# Patient Record
Sex: MALE | Race: WHITE | NOT HISPANIC OR LATINO | Employment: FULL TIME | URBAN - METROPOLITAN AREA
[De-identification: names, ages, dates, MRNs, and addresses within clinical notes are randomized per-mention and may not be internally consistent; named-entity substitution may affect disease eponyms.]

---

## 2018-08-29 ENCOUNTER — APPOINTMENT (OUTPATIENT)
Dept: RADIOLOGY | Facility: MEDICAL CENTER | Age: 33
DRG: 224 | End: 2018-08-29
Attending: INTERNAL MEDICINE
Payer: COMMERCIAL

## 2018-08-29 ENCOUNTER — APPOINTMENT (OUTPATIENT)
Dept: RADIOLOGY | Facility: MEDICAL CENTER | Age: 33
DRG: 224 | End: 2018-08-29
Attending: EMERGENCY MEDICINE
Payer: COMMERCIAL

## 2018-08-29 ENCOUNTER — HOSPITAL ENCOUNTER (INPATIENT)
Facility: MEDICAL CENTER | Age: 33
LOS: 4 days | DRG: 224 | End: 2018-09-02
Attending: EMERGENCY MEDICINE | Admitting: INTERNAL MEDICINE
Payer: COMMERCIAL

## 2018-08-29 DIAGNOSIS — I46.9 CARDIAC ARREST (HCC): ICD-10-CM

## 2018-08-29 PROBLEM — Z99.11 VENTILATOR DEPENDENT (HCC): Status: ACTIVE | Noted: 2018-08-29

## 2018-08-29 PROBLEM — I35.0 AORTIC STENOSIS: Status: ACTIVE | Noted: 2018-08-29

## 2018-08-29 PROBLEM — N17.9 AKI (ACUTE KIDNEY INJURY) (HCC): Status: ACTIVE | Noted: 2018-08-29

## 2018-08-29 PROBLEM — R79.89 ELEVATED TROPONIN: Status: ACTIVE | Noted: 2018-08-29

## 2018-08-29 PROBLEM — D72.829 LEUKOCYTOSIS: Status: ACTIVE | Noted: 2018-08-29

## 2018-08-29 PROBLEM — R73.9 HYPERGLYCEMIA: Status: ACTIVE | Noted: 2018-08-29

## 2018-08-29 PROBLEM — E87.6 HYPOKALEMIA: Status: ACTIVE | Noted: 2018-08-29

## 2018-08-29 LAB
ABO GROUP BLD: NORMAL
ABO GROUP BLD: NORMAL
ACTION RANGE TRIGGERED IACRT: NO
ACTION RANGE TRIGGERED IACRT: NO
ALBUMIN SERPL BCP-MCNC: 4.4 G/DL (ref 3.2–4.9)
ALBUMIN/GLOB SERPL: 1.9 G/DL
ALP SERPL-CCNC: 68 U/L (ref 30–99)
ALT SERPL-CCNC: 33 U/L (ref 2–50)
AMPHET UR QL SCN: NEGATIVE
AMPHET UR QL SCN: NEGATIVE
ANION GAP SERPL CALC-SCNC: 12 MMOL/L (ref 0–11.9)
APPEARANCE UR: ABNORMAL
APTT PPP: 30.7 SEC (ref 24.7–36)
AST SERPL-CCNC: 40 U/L (ref 12–45)
BACTERIA #/AREA URNS HPF: ABNORMAL /HPF
BARBITURATES UR QL SCN: NEGATIVE
BARBITURATES UR QL SCN: NEGATIVE
BASE EXCESS BLDA CALC-SCNC: -1 MMOL/L (ref -4–3)
BASE EXCESS BLDA CALC-SCNC: 0 MMOL/L (ref -4–3)
BASOPHILS # BLD AUTO: 0.5 % (ref 0–1.8)
BASOPHILS # BLD: 0.07 K/UL (ref 0–0.12)
BENZODIAZ UR QL SCN: POSITIVE
BENZODIAZ UR QL SCN: POSITIVE
BILIRUB SERPL-MCNC: 1 MG/DL (ref 0.1–1.5)
BILIRUB UR QL STRIP.AUTO: NEGATIVE
BLD GP AB SCN SERPL QL: NORMAL
BNP SERPL-MCNC: 30 PG/ML (ref 0–100)
BODY TEMPERATURE: ABNORMAL DEGREES
BODY TEMPERATURE: ABNORMAL DEGREES
BUN SERPL-MCNC: 27 MG/DL (ref 8–22)
BZE UR QL SCN: NEGATIVE
BZE UR QL SCN: NEGATIVE
CALCIUM SERPL-MCNC: 8.4 MG/DL (ref 8.5–10.5)
CANNABINOIDS UR QL SCN: POSITIVE
CANNABINOIDS UR QL SCN: POSITIVE
CHLORIDE SERPL-SCNC: 106 MMOL/L (ref 96–112)
CO2 BLDA-SCNC: 25 MMOL/L (ref 20–33)
CO2 BLDA-SCNC: 28 MMOL/L (ref 20–33)
CO2 SERPL-SCNC: 23 MMOL/L (ref 20–33)
COLOR UR: YELLOW
CREAT SERPL-MCNC: 0.97 MG/DL (ref 0.5–1.4)
EKG IMPRESSION: NORMAL
EOSINOPHIL # BLD AUTO: 0.01 K/UL (ref 0–0.51)
EOSINOPHIL NFR BLD: 0.1 % (ref 0–6.9)
EPI CELLS #/AREA URNS HPF: ABNORMAL /HPF
ERYTHROCYTE [DISTWIDTH] IN BLOOD BY AUTOMATED COUNT: 41 FL (ref 35.9–50)
GLOBULIN SER CALC-MCNC: 2.3 G/DL (ref 1.9–3.5)
GLUCOSE SERPL-MCNC: 119 MG/DL (ref 65–99)
GLUCOSE UR STRIP.AUTO-MCNC: NEGATIVE MG/DL
HCO3 BLDA-SCNC: 24.1 MMOL/L (ref 17–25)
HCO3 BLDA-SCNC: 26.2 MMOL/L (ref 17–25)
HCT VFR BLD AUTO: 50.8 % (ref 42–52)
HGB BLD-MCNC: 17.5 G/DL (ref 14–18)
HYALINE CASTS #/AREA URNS LPF: ABNORMAL /LPF
IMM GRANULOCYTES # BLD AUTO: 0.07 K/UL (ref 0–0.11)
IMM GRANULOCYTES NFR BLD AUTO: 0.5 % (ref 0–0.9)
INR PPP: 3.04 (ref 0.87–1.13)
INST. QUALIFIED PATIENT IIQPT: YES
INST. QUALIFIED PATIENT IIQPT: YES
KETONES UR STRIP.AUTO-MCNC: NEGATIVE MG/DL
LEUKOCYTE ESTERASE UR QL STRIP.AUTO: ABNORMAL
LV EJECT FRACT  99904: 35
LV EJECT FRACT MOD 2C 99903: 49.07
LV EJECT FRACT MOD 4C 99902: 47.49
LV EJECT FRACT MOD BP 99901: 53.13
LYMPHOCYTES # BLD AUTO: 1.06 K/UL (ref 1–4.8)
LYMPHOCYTES NFR BLD: 7.1 % (ref 22–41)
MAGNESIUM SERPL-MCNC: 2 MG/DL (ref 1.5–2.5)
MCH RBC QN AUTO: 30.9 PG (ref 27–33)
MCHC RBC AUTO-ENTMCNC: 34.4 G/DL (ref 33.7–35.3)
MCV RBC AUTO: 89.8 FL (ref 81.4–97.8)
METHADONE UR QL SCN: NEGATIVE
METHADONE UR QL SCN: NEGATIVE
MICRO URNS: ABNORMAL
MONOCYTES # BLD AUTO: 0.63 K/UL (ref 0–0.85)
MONOCYTES NFR BLD AUTO: 4.2 % (ref 0–13.4)
NEUTROPHILS # BLD AUTO: 13.06 K/UL (ref 1.82–7.42)
NEUTROPHILS NFR BLD: 87.6 % (ref 44–72)
NITRITE UR QL STRIP.AUTO: NEGATIVE
NRBC # BLD AUTO: 0 K/UL
NRBC BLD-RTO: 0 /100 WBC
O2/TOTAL GAS SETTING VFR VENT: 100 %
O2/TOTAL GAS SETTING VFR VENT: 60 %
OPIATES UR QL SCN: NEGATIVE
OPIATES UR QL SCN: NEGATIVE
OXYCODONE UR QL SCN: NEGATIVE
OXYCODONE UR QL SCN: NEGATIVE
PCO2 BLDA: 42.7 MMHG (ref 26–37)
PCO2 BLDA: 48.6 MMHG (ref 26–37)
PCO2 TEMP ADJ BLDA: 44.4 MMHG (ref 26–37)
PCO2 TEMP ADJ BLDA: 49.5 MMHG (ref 26–37)
PCP UR QL SCN: NEGATIVE
PCP UR QL SCN: NEGATIVE
PH BLDA: 7.34 [PH] (ref 7.4–7.5)
PH BLDA: 7.36 [PH] (ref 7.4–7.5)
PH TEMP ADJ BLDA: 7.33 [PH] (ref 7.4–7.5)
PH TEMP ADJ BLDA: 7.35 [PH] (ref 7.4–7.5)
PH UR STRIP.AUTO: 5 [PH]
PHOSPHATE SERPL-MCNC: 3.8 MG/DL (ref 2.5–4.5)
PLATELET # BLD AUTO: 238 K/UL (ref 164–446)
PMV BLD AUTO: 9.4 FL (ref 9–12.9)
PO2 BLDA: 156 MMHG (ref 64–87)
PO2 BLDA: 248 MMHG (ref 64–87)
PO2 TEMP ADJ BLDA: 162 MMHG (ref 64–87)
PO2 TEMP ADJ BLDA: 250 MMHG (ref 64–87)
POTASSIUM SERPL-SCNC: 3.8 MMOL/L (ref 3.6–5.5)
PROPOXYPH UR QL SCN: NEGATIVE
PROPOXYPH UR QL SCN: NEGATIVE
PROT SERPL-MCNC: 6.7 G/DL (ref 6–8.2)
PROT UR QL STRIP: 100 MG/DL
PROTHROMBIN TIME: 31.2 SEC (ref 12–14.6)
RBC # BLD AUTO: 5.66 M/UL (ref 4.7–6.1)
RBC # URNS HPF: ABNORMAL /HPF
RBC UR QL AUTO: ABNORMAL
RH BLD: NORMAL
RH BLD: NORMAL
SAO2 % BLDA: 100 % (ref 93–99)
SAO2 % BLDA: 99 % (ref 93–99)
SODIUM SERPL-SCNC: 141 MMOL/L (ref 135–145)
SP GR UR STRIP.AUTO: 1.02
SPECIMEN DRAWN FROM PATIENT: ABNORMAL
SPECIMEN DRAWN FROM PATIENT: ABNORMAL
TRIGL SERPL-MCNC: 119 MG/DL (ref 0–149)
TROPONIN I SERPL-MCNC: 1.48 NG/ML (ref 0–0.04)
TROPONIN I SERPL-MCNC: 2.05 NG/ML (ref 0–0.04)
TROPONIN I SERPL-MCNC: 3.27 NG/ML (ref 0–0.04)
UROBILINOGEN UR STRIP.AUTO-MCNC: 1 MG/DL
WBC # BLD AUTO: 14.9 K/UL (ref 4.8–10.8)
WBC #/AREA URNS HPF: ABNORMAL /HPF

## 2018-08-29 PROCEDURE — 86901 BLOOD TYPING SEROLOGIC RH(D): CPT

## 2018-08-29 PROCEDURE — 83735 ASSAY OF MAGNESIUM: CPT

## 2018-08-29 PROCEDURE — 81001 URINALYSIS AUTO W/SCOPE: CPT

## 2018-08-29 PROCEDURE — 36600 WITHDRAWAL OF ARTERIAL BLOOD: CPT

## 2018-08-29 PROCEDURE — 94002 VENT MGMT INPAT INIT DAY: CPT

## 2018-08-29 PROCEDURE — 700102 HCHG RX REV CODE 250 W/ 637 OVERRIDE(OP): Performed by: INTERNAL MEDICINE

## 2018-08-29 PROCEDURE — 304538 HCHG NG TUBE

## 2018-08-29 PROCEDURE — A9270 NON-COVERED ITEM OR SERVICE: HCPCS | Performed by: INTERNAL MEDICINE

## 2018-08-29 PROCEDURE — 93306 TTE W/DOPPLER COMPLETE: CPT

## 2018-08-29 PROCEDURE — 700101 HCHG RX REV CODE 250: Performed by: INTERNAL MEDICINE

## 2018-08-29 PROCEDURE — 87040 BLOOD CULTURE FOR BACTERIA: CPT | Mod: 91

## 2018-08-29 PROCEDURE — 96366 THER/PROPH/DIAG IV INF ADDON: CPT

## 2018-08-29 PROCEDURE — 303105 HCHG CATHETER EXTRA

## 2018-08-29 PROCEDURE — 700102 HCHG RX REV CODE 250 W/ 637 OVERRIDE(OP): Performed by: STUDENT IN AN ORGANIZED HEALTH CARE EDUCATION/TRAINING PROGRAM

## 2018-08-29 PROCEDURE — 5A1935Z RESPIRATORY VENTILATION, LESS THAN 24 CONSECUTIVE HOURS: ICD-10-PCS | Performed by: EMERGENCY MEDICINE

## 2018-08-29 PROCEDURE — 93005 ELECTROCARDIOGRAM TRACING: CPT | Performed by: EMERGENCY MEDICINE

## 2018-08-29 PROCEDURE — 94760 N-INVAS EAR/PLS OXIMETRY 1: CPT

## 2018-08-29 PROCEDURE — 51702 INSERT TEMP BLADDER CATH: CPT

## 2018-08-29 PROCEDURE — 700105 HCHG RX REV CODE 258: Performed by: INTERNAL MEDICINE

## 2018-08-29 PROCEDURE — 84100 ASSAY OF PHOSPHORUS: CPT

## 2018-08-29 PROCEDURE — 71045 X-RAY EXAM CHEST 1 VIEW: CPT

## 2018-08-29 PROCEDURE — 700111 HCHG RX REV CODE 636 W/ 250 OVERRIDE (IP)

## 2018-08-29 PROCEDURE — 85730 THROMBOPLASTIN TIME PARTIAL: CPT

## 2018-08-29 PROCEDURE — 700105 HCHG RX REV CODE 258: Performed by: STUDENT IN AN ORGANIZED HEALTH CARE EDUCATION/TRAINING PROGRAM

## 2018-08-29 PROCEDURE — 84478 ASSAY OF TRIGLYCERIDES: CPT

## 2018-08-29 PROCEDURE — 94150 VITAL CAPACITY TEST: CPT

## 2018-08-29 PROCEDURE — 80053 COMPREHEN METABOLIC PANEL: CPT

## 2018-08-29 PROCEDURE — 82803 BLOOD GASES ANY COMBINATION: CPT | Mod: 91

## 2018-08-29 PROCEDURE — 93306 TTE W/DOPPLER COMPLETE: CPT | Mod: 26 | Performed by: INTERNAL MEDICINE

## 2018-08-29 PROCEDURE — 99291 CRITICAL CARE FIRST HOUR: CPT | Performed by: INTERNAL MEDICINE

## 2018-08-29 PROCEDURE — 700111 HCHG RX REV CODE 636 W/ 250 OVERRIDE (IP): Performed by: STUDENT IN AN ORGANIZED HEALTH CARE EDUCATION/TRAINING PROGRAM

## 2018-08-29 PROCEDURE — 700111 HCHG RX REV CODE 636 W/ 250 OVERRIDE (IP): Performed by: INTERNAL MEDICINE

## 2018-08-29 PROCEDURE — 84484 ASSAY OF TROPONIN QUANT: CPT | Mod: 91

## 2018-08-29 PROCEDURE — 80307 DRUG TEST PRSMV CHEM ANLYZR: CPT

## 2018-08-29 PROCEDURE — 700101 HCHG RX REV CODE 250: Performed by: STUDENT IN AN ORGANIZED HEALTH CARE EDUCATION/TRAINING PROGRAM

## 2018-08-29 PROCEDURE — 85025 COMPLETE CBC W/AUTO DIFF WBC: CPT

## 2018-08-29 PROCEDURE — 70450 CT HEAD/BRAIN W/O DYE: CPT

## 2018-08-29 PROCEDURE — 770022 HCHG ROOM/CARE - ICU (200)

## 2018-08-29 PROCEDURE — 36415 COLL VENOUS BLD VENIPUNCTURE: CPT

## 2018-08-29 PROCEDURE — 83880 ASSAY OF NATRIURETIC PEPTIDE: CPT

## 2018-08-29 PROCEDURE — 99292 CRITICAL CARE ADDL 30 MIN: CPT | Performed by: INTERNAL MEDICINE

## 2018-08-29 PROCEDURE — 86850 RBC ANTIBODY SCREEN: CPT

## 2018-08-29 PROCEDURE — 96365 THER/PROPH/DIAG IV INF INIT: CPT

## 2018-08-29 PROCEDURE — 700105 HCHG RX REV CODE 258: Performed by: EMERGENCY MEDICINE

## 2018-08-29 PROCEDURE — 85610 PROTHROMBIN TIME: CPT

## 2018-08-29 PROCEDURE — 700111 HCHG RX REV CODE 636 W/ 250 OVERRIDE (IP): Performed by: EMERGENCY MEDICINE

## 2018-08-29 PROCEDURE — 99291 CRITICAL CARE FIRST HOUR: CPT

## 2018-08-29 PROCEDURE — 86900 BLOOD TYPING SEROLOGIC ABO: CPT

## 2018-08-29 PROCEDURE — A9270 NON-COVERED ITEM OR SERVICE: HCPCS | Performed by: STUDENT IN AN ORGANIZED HEALTH CARE EDUCATION/TRAINING PROGRAM

## 2018-08-29 PROCEDURE — 96368 THER/DIAG CONCURRENT INF: CPT

## 2018-08-29 RX ORDER — SODIUM CHLORIDE, SODIUM LACTATE, POTASSIUM CHLORIDE, CALCIUM CHLORIDE 600; 310; 30; 20 MG/100ML; MG/100ML; MG/100ML; MG/100ML
INJECTION, SOLUTION INTRAVENOUS CONTINUOUS
Status: DISCONTINUED | OUTPATIENT
Start: 2018-08-29 | End: 2018-08-29

## 2018-08-29 RX ORDER — SODIUM CHLORIDE, SODIUM LACTATE, POTASSIUM CHLORIDE, CALCIUM CHLORIDE 600; 310; 30; 20 MG/100ML; MG/100ML; MG/100ML; MG/100ML
INJECTION, SOLUTION INTRAVENOUS CONTINUOUS
Status: DISCONTINUED | OUTPATIENT
Start: 2018-08-29 | End: 2018-08-30

## 2018-08-29 RX ORDER — OXYCODONE HYDROCHLORIDE 10 MG/1
10 TABLET ORAL EVERY 4 HOURS PRN
Status: DISCONTINUED | OUTPATIENT
Start: 2018-08-29 | End: 2018-08-31

## 2018-08-29 RX ORDER — IPRATROPIUM BROMIDE AND ALBUTEROL SULFATE 2.5; .5 MG/3ML; MG/3ML
3 SOLUTION RESPIRATORY (INHALATION)
Status: DISCONTINUED | OUTPATIENT
Start: 2018-08-29 | End: 2018-08-30 | Stop reason: ALTCHOICE

## 2018-08-29 RX ORDER — BISACODYL 10 MG
10 SUPPOSITORY, RECTAL RECTAL
Status: DISCONTINUED | OUTPATIENT
Start: 2018-08-29 | End: 2018-08-29

## 2018-08-29 RX ORDER — ONDANSETRON 2 MG/ML
4 INJECTION INTRAMUSCULAR; INTRAVENOUS EVERY 4 HOURS PRN
Status: DISCONTINUED | OUTPATIENT
Start: 2018-08-29 | End: 2018-09-02 | Stop reason: HOSPADM

## 2018-08-29 RX ORDER — HEPARIN SODIUM 1000 [USP'U]/ML
2600 INJECTION, SOLUTION INTRAVENOUS; SUBCUTANEOUS PRN
Status: DISCONTINUED | OUTPATIENT
Start: 2018-08-29 | End: 2018-08-29

## 2018-08-29 RX ORDER — POLYETHYLENE GLYCOL 3350 17 G/17G
1 POWDER, FOR SOLUTION ORAL
Status: DISCONTINUED | OUTPATIENT
Start: 2018-08-29 | End: 2018-09-01

## 2018-08-29 RX ORDER — POLYETHYLENE GLYCOL 3350 17 G/17G
1 POWDER, FOR SOLUTION ORAL
Status: DISCONTINUED | OUTPATIENT
Start: 2018-08-29 | End: 2018-08-29

## 2018-08-29 RX ORDER — HEPARIN SODIUM 5000 [USP'U]/ML
5000 INJECTION, SOLUTION INTRAVENOUS; SUBCUTANEOUS EVERY 8 HOURS
Status: DISCONTINUED | OUTPATIENT
Start: 2018-08-29 | End: 2018-08-29

## 2018-08-29 RX ORDER — DIPHENHYDRAMINE HYDROCHLORIDE 50 MG/ML
25 INJECTION INTRAMUSCULAR; INTRAVENOUS EVERY 6 HOURS PRN
Status: DISCONTINUED | OUTPATIENT
Start: 2018-08-29 | End: 2018-09-02 | Stop reason: HOSPADM

## 2018-08-29 RX ORDER — BISACODYL 10 MG
10 SUPPOSITORY, RECTAL RECTAL
Status: DISCONTINUED | OUTPATIENT
Start: 2018-08-29 | End: 2018-09-01

## 2018-08-29 RX ORDER — OXYCODONE HYDROCHLORIDE 5 MG/1
5 TABLET ORAL EVERY 4 HOURS PRN
Status: DISCONTINUED | OUTPATIENT
Start: 2018-08-29 | End: 2018-08-31

## 2018-08-29 RX ORDER — AMOXICILLIN 250 MG
2 CAPSULE ORAL 2 TIMES DAILY
Status: DISCONTINUED | OUTPATIENT
Start: 2018-08-29 | End: 2018-09-01

## 2018-08-29 RX ORDER — FAMOTIDINE 20 MG/1
20 TABLET, FILM COATED ORAL 2 TIMES DAILY
Status: DISCONTINUED | OUTPATIENT
Start: 2018-08-29 | End: 2018-08-30

## 2018-08-29 RX ORDER — ONDANSETRON 2 MG/ML
INJECTION INTRAMUSCULAR; INTRAVENOUS
Status: DISCONTINUED
Start: 2018-08-29 | End: 2018-08-29

## 2018-08-29 RX ORDER — DEXTROSE MONOHYDRATE 50 MG/ML
INJECTION, SOLUTION INTRAVENOUS CONTINUOUS
Status: DISCONTINUED | OUTPATIENT
Start: 2018-08-29 | End: 2018-08-31

## 2018-08-29 RX ORDER — AMOXICILLIN 250 MG
2 CAPSULE ORAL 2 TIMES DAILY
Status: DISCONTINUED | OUTPATIENT
Start: 2018-08-29 | End: 2018-08-29

## 2018-08-29 RX ORDER — IPRATROPIUM BROMIDE AND ALBUTEROL SULFATE 2.5; .5 MG/3ML; MG/3ML
3 SOLUTION RESPIRATORY (INHALATION)
Status: DISCONTINUED | OUTPATIENT
Start: 2018-08-29 | End: 2018-09-02 | Stop reason: HOSPADM

## 2018-08-29 RX ORDER — ACETAMINOPHEN 325 MG/1
650 TABLET ORAL EVERY 4 HOURS PRN
Status: DISCONTINUED | OUTPATIENT
Start: 2018-08-29 | End: 2018-09-02 | Stop reason: HOSPADM

## 2018-08-29 RX ADMIN — PROPOFOL 20 MCG/KG/MIN: 10 INJECTION, EMULSION INTRAVENOUS at 00:49

## 2018-08-29 RX ADMIN — ONDANSETRON 4 MG: 2 INJECTION INTRAMUSCULAR; INTRAVENOUS at 20:57

## 2018-08-29 RX ADMIN — NOREPINEPHRINE BITARTRATE 2 MCG/MIN: 1 INJECTION INTRAVENOUS at 20:28

## 2018-08-29 RX ADMIN — DEXTROSE MONOHYDRATE: 50 INJECTION, SOLUTION INTRAVENOUS at 08:37

## 2018-08-29 RX ADMIN — AMIODARONE HYDROCHLORIDE 0.99 MG/MIN: 50 INJECTION, SOLUTION INTRAVENOUS at 01:26

## 2018-08-29 RX ADMIN — ONDANSETRON 4 MG: 2 INJECTION INTRAMUSCULAR; INTRAVENOUS at 18:27

## 2018-08-29 RX ADMIN — PROPOFOL 30 MCG/KG/MIN: 10 INJECTION, EMULSION INTRAVENOUS at 02:09

## 2018-08-29 RX ADMIN — FAMOTIDINE 20 MG: 20 TABLET ORAL at 17:35

## 2018-08-29 RX ADMIN — AMPICILLIN SODIUM AND SULBACTAM SODIUM 3 G: 2; 1 INJECTION, POWDER, FOR SOLUTION INTRAMUSCULAR; INTRAVENOUS at 18:39

## 2018-08-29 RX ADMIN — AMIODARONE HYDROCHLORIDE 1 MG/MIN: 50 INJECTION, SOLUTION INTRAVENOUS at 10:20

## 2018-08-29 RX ADMIN — SODIUM CHLORIDE, POTASSIUM CHLORIDE, SODIUM LACTATE AND CALCIUM CHLORIDE: 600; 310; 30; 20 INJECTION, SOLUTION INTRAVENOUS at 03:35

## 2018-08-29 RX ADMIN — Medication 2 TABLET: at 18:46

## 2018-08-29 RX ADMIN — SODIUM CHLORIDE, POTASSIUM CHLORIDE, SODIUM LACTATE AND CALCIUM CHLORIDE 50 ML: 600; 310; 30; 20 INJECTION, SOLUTION INTRAVENOUS at 20:30

## 2018-08-29 RX ADMIN — ACETAMINOPHEN 650 MG: 325 TABLET, FILM COATED ORAL at 18:19

## 2018-08-29 RX ADMIN — PROPOFOL 45 MCG/KG/MIN: 10 INJECTION, EMULSION INTRAVENOUS at 05:21

## 2018-08-29 RX ADMIN — DEXMEDETOMIDINE HYDROCHLORIDE 0.2 MCG/KG/HR: 100 INJECTION, SOLUTION INTRAVENOUS at 15:00

## 2018-08-29 RX ADMIN — PROPOFOL 25 MCG/KG/MIN: 10 INJECTION, EMULSION INTRAVENOUS at 01:50

## 2018-08-29 RX ADMIN — PROPOFOL 35 MCG/KG/MIN: 10 INJECTION, EMULSION INTRAVENOUS at 03:19

## 2018-08-29 RX ADMIN — AMIODARONE HYDROCHLORIDE 1 MG/MIN: 50 INJECTION, SOLUTION INTRAVENOUS at 17:53

## 2018-08-29 RX ADMIN — OXYCODONE HYDROCHLORIDE 5 MG: 10 TABLET ORAL at 16:45

## 2018-08-29 RX ADMIN — PROPOFOL 55 MCG/KG/MIN: 10 INJECTION, EMULSION INTRAVENOUS at 13:22

## 2018-08-29 RX ADMIN — PROPOFOL 40 MCG/KG/MIN: 10 INJECTION, EMULSION INTRAVENOUS at 09:45

## 2018-08-29 ASSESSMENT — PAIN SCALES - GENERAL
PAINLEVEL_OUTOF10: 0
PAINLEVEL_OUTOF10: 0

## 2018-08-29 ASSESSMENT — PULMONARY FUNCTION TESTS
FVC: 1.95
FVC: 1.34

## 2018-08-29 NOTE — ASSESSMENT & PLAN NOTE
- S/p aortic valve replacement in 2003 for aortic stenosis, on coumadin  -Aortic valve fluoroscopy shows normal annular stability without rocking and a normal leaflet motion  -S/P SQ ICD and warfarin reinitiated.  INR at 2.73 at time of discharge.

## 2018-08-29 NOTE — ASSESSMENT & PLAN NOTE
- Transferred from [facility] due to presumed cardiac arrest, required CPR and 2 shocks with AED, unclear of duration of even. Per chart, pt was alert and able to answer questions post ROCS, no records currently available, they have been requested. Patient has history of congenital cardiac malformation, aortic valve replacement at age 18 and on coumadin.   - Started on amiodarone drip prior to arrival.  -Patient had a coronary angiography done on 8/30/18, which revealed normal left main, left anterior descending, left circumflex, right coronary with moderate calibers and no CAD.  -Patient had a aortic valve fluoroscopy on 8/30/80 which revealed normal annular stability without rocking, and a normal leaflet motion.  -Appreciate cardiology recommendations  -Magnesium 1.6 phosphorus 1.6 potassium 3.6 on 8/31/18.  PLAN  - trop trended down.  - Has remained in NSR.   - echocardiogram shows reduced EF 35% may be secondary to cardiac event, but repeat cardiac MRI showed significant improvement in EF.   -Patient had AICD placed.  -Patient takes warfarin 7.5 mg on weekdays and 10 mg on Wednesdays and Fridays, and is trying to be scheduled by [facility] in [place] on Tuesday 9/4/2018 both with the cardiologist and the Coumadin clinic.

## 2018-08-29 NOTE — CARE PLAN
Problem: Ventilation Defect:  Goal: Ability to achieve and maintain unassisted ventilation or tolerate decreased levels of ventilator support  Adult Ventilation Update    Total Vent Days: 1    Patient Lines/Drains/Airways Status    Active Airway     Name: Placement date: Placement time: Site: Days:    Airway Group ET Tube Oral 7.0 08/29/18      Oral   less than 1              In the last 24 hours, the patient did not tolerate SBT trial. He failed secondary to apnea x 4. RN aware.  Continue to support respiratory status and wean as tolerated.

## 2018-08-29 NOTE — ASSESSMENT & PLAN NOTE
-Patient mildly hyperglycemic on admission, blood glucose 119. Likely due to stress response   - will continue to monitor   -HbA1c at 5.3

## 2018-08-29 NOTE — ASSESSMENT & PLAN NOTE
Wbc 14.9 on admission, no evidence of infection, afebrile. Likely secondary to stress from cardiac event   - resolved

## 2018-08-29 NOTE — CONSULTS
Reason for Consult:  Asked by Dr Ricketts to see this patient with ventricular arrhythmia arrest  Patient's PCP: No primary care provider on file.    CC:   Chief Complaint   Patient presents with   • Cardiac Arrest       HPI: This is a 33-year-old gentleman with history of mechanical aortic valve replacement who was at [facility] and reportedly was found unresponsive in a knee was applied and he was shocked, intubated and started on amiodarone by medical staff there prior to 2300 hrs. last evening.  By care flight he received sedation and antiarrhythmics and transferred here where his EKG shows sinus rhythm and his labs show mild hypokalemia U tox only positive for marijuana.  Remainder of the history is unobtainable at this time although bystanders should be arriving to help provide clinical history there is no records in the care everywhere system from [facility] or [place].  The patient reported to be on Coumadin and we are waiting coagulation he had i-STAT labs likely done at [facility] [place] which show normal CBC and kidney function potassium 3.0    Medications / Drug list prior to admission:  No current facility-administered medications on file prior to encounter.      No current outpatient prescriptions on file prior to encounter.       Current list of administered Medications:    Current Facility-Administered Medications:   •  D5W infusion, , Intravenous, Continuous, Rajan Cao M.D.  •  amiodarone (CORDARONE) 450 mg in D5W 250 mL Infusion, 0.5-1 mg/min, Intravenous, Continuous, Rajan Cao M.D., Last Rate: 33 mL/hr at 08/29/18 0126, 0.99 mg/min at 08/29/18 0126  •  propofol (DIPRIVAN) injection, 0-80 mcg/kg/min, Intravenous, Continuous, Last Rate: 12.6 mL/hr at 08/29/18 0209, 30 mcg/kg/min at 08/29/18 0209 **AND** Triglycerides Starting now and then Every 3 Days, , , Every 3 Days (0300), Rajan Cao M.D.  •  Respiratory Care per Protocol, , Nebulization, Continuous  RT, Lalo Wilkins M.D.  •  senna-docusate (PERICOLACE or SENOKOT S) 8.6-50 MG per tablet 2 Tab, 2 Tab, Oral, BID **AND** polyethylene glycol/lytes (MIRALAX) PACKET 1 Packet, 1 Packet, Oral, QDAY PRN **AND** magnesium hydroxide (MILK OF MAGNESIA) suspension 30 mL, 30 mL, Oral, QDAY PRN **AND** bisacodyl (DULCOLAX) suppository 10 mg, 10 mg, Rectal, QDAY PRN, Lalo Wilkins M.D.  •  MD ALERT...Adult ICU Electrolyte Replacement per Pharmacy Protocol, , Other, pharmacy to dose, Lalo Wilkins M.D.  •  lactated ringers infusion, , Intravenous, Continuous, Laol Wilkins M.D.  •  ipratropium-albuterol (DUONEB) nebulizer solution, 3 mL, Nebulization, Q4H PRN (RT), Lalo Wilkins M.D.  •  heparin injection 5,000 Units, 5,000 Units, Subcutaneous, Q8HRS, Lalo Wilkins M.D.  No current outpatient prescriptions on file.    No past medical history on file.    surgical history includes aortic valve replacement unknown history    No family history on file. Unobtainable at this time    Social History     Social History   • Marital status: N/A     Spouse name: N/A   • Number of children: N/A   • Years of education: N/A     Occupational History   • Not on file.     Social History Main Topics   • Smoking status: Not on file   • Smokeless tobacco: Not on file   • Alcohol use Not on file   • Drug use: Unknown   • Sexual activity: Not on file     Other Topics Concern   • Not on file     Social History Narrative   • No narrative on file     Social history is unobtainable at this time but he is positive for marijuana on U tox benzodiazepines but he had received Versed for sedation  ALLERGIES:  Allergies   Allergen Reactions   • Penicillins    • Zithromax [Azithromycin]        Review of systems:  A detailed review of symptoms was not able to be obtained given his intubated status rest -3    Physical exam:  Patient Vitals for the past 24 hrs:   BP Temp Pulse Resp SpO2 Weight   08/29/18 0221 - - 92 (!) 23 100 % -   08/29/18  0220 - 37.4 °C (99.3 °F) - - - -   08/29/18 0215 - - 93 18 100 % -   08/29/18 0213 - - 81 (!) 24 91 % -   08/29/18 0130 - - 88 (!) 22 100 % -   08/29/18 0115 - - 89 (!) 23 100 % -   08/29/18 0100 - - 85 (!) 24 100 % -   08/29/18 0046 111/79 - 87 (!) 22 100 % -   08/29/18 0045 - - - 20 - 70 kg (154 lb 5.2 oz)   08/29/18 0043 - - - - 99 % -   08/29/18 0000 - - - - - 70 kg (154 lb 5.2 oz)       General: RASS -3 on propofol drip  EYES: no jaundice  HEENT: OP clear with ET tube  Neck: No bruits No JVD.   CVS: Regular rate and rhythm he has mechanical click although his heart sounds are little muffled  Resp: Ventilated sounds  Abdomen: Soft, NT, ND,  Skin: Grossly nothing acute no obvious rashes  Neurological: Sedated on ventilator no posturing  Extremities: Pulse 2+ in b/l LE. no edema. No cyanosis.       Data:  Laboratory studies:  Recent Results (from the past 24 hour(s))   URINE DRUG SCREEN (TRIAGE)    Collection Time: 08/29/18 12:55 AM   Result Value Ref Range    Amphetamines Urine Negative Negative    Barbiturates Negative Negative    Benzodiazepines Positive (A) Negative    Cocaine Metabolite Negative Negative    Methadone Negative Negative    Opiates Negative Negative    Oxycodone Negative Negative    Phencyclidine -Pcp Negative Negative    Propoxyphene Negative Negative    Cannabinoid Metab Positive (A) Negative   URINALYSIS CULTURE, IF INDICATED    Collection Time: 08/29/18 12:55 AM   Result Value Ref Range    Color Yellow     Character Cloudy (A)     Specific Gravity 1.022 <1.035    Ph 5.0 5.0 - 8.0    Glucose Negative Negative mg/dL    Ketones Negative Negative mg/dL    Protein 100 (A) Negative mg/dL    Bilirubin Negative Negative    Urobilinogen, Urine 1.0 Negative    Nitrite Negative Negative    Leukocyte Esterase Trace (A) Negative    Occult Blood Large (A) Negative    Micro Urine Req Microscopic    URINE MICROSCOPIC (W/UA)    Collection Time: 08/29/18 12:55 AM   Result Value Ref Range    Epithelial Cells  Rare /hpf   COD (ADULT)    Collection Time: 08/29/18  1:15 AM   Result Value Ref Range    ABO Grouping Only B     Rh Grouping Only POS     Antibody Screen-Cod NEG    COMP METABOLIC PANEL    Collection Time: 08/29/18  1:28 AM   Result Value Ref Range    Sodium 141 135 - 145 mmol/L    Potassium 3.8 3.6 - 5.5 mmol/L    Chloride 106 96 - 112 mmol/L    Co2 23 20 - 33 mmol/L    Anion Gap 12.0 (H) 0.0 - 11.9    Glucose 119 (H) 65 - 99 mg/dL    Bun 27 (H) 8 - 22 mg/dL    Creatinine 0.97 0.50 - 1.40 mg/dL    Calcium 8.4 (L) 8.5 - 10.5 mg/dL    AST(SGOT) 40 12 - 45 U/L    ALT(SGPT) 33 2 - 50 U/L    Alkaline Phosphatase 68 30 - 99 U/L    Total Bilirubin 1.0 0.1 - 1.5 mg/dL    Albumin 4.4 3.2 - 4.9 g/dL    Total Protein 6.7 6.0 - 8.2 g/dL    Globulin 2.3 1.9 - 3.5 g/dL    A-G Ratio 1.9 g/dL   TROPONIN    Collection Time: 08/29/18  1:28 AM   Result Value Ref Range    Troponin I 2.05 (H) 0.00 - 0.04 ng/mL   BTYPE NATRIURETIC PEPTIDE    Collection Time: 08/29/18  1:28 AM   Result Value Ref Range    B Natriuretic Peptide 30 0 - 100 pg/mL   TRIGLYCERIDE    Collection Time: 08/29/18  1:28 AM   Result Value Ref Range    Triglycerides 119 0 - 149 mg/dL   CBC WITH DIFFERENTIAL    Collection Time: 08/29/18  1:28 AM   Result Value Ref Range    WBC 14.9 (H) 4.8 - 10.8 K/uL    RBC 5.66 4.70 - 6.10 M/uL    Hemoglobin 17.5 14.0 - 18.0 g/dL    Hematocrit 50.8 42.0 - 52.0 %    MCV 89.8 81.4 - 97.8 fL    MCH 30.9 27.0 - 33.0 pg    MCHC 34.4 33.7 - 35.3 g/dL    RDW 41.0 35.9 - 50.0 fL    Platelet Count 238 164 - 446 K/uL    MPV 9.4 9.0 - 12.9 fL    Neutrophils-Polys 87.60 (H) 44.00 - 72.00 %    Lymphocytes 7.10 (L) 22.00 - 41.00 %    Monocytes 4.20 0.00 - 13.40 %    Eosinophils 0.10 0.00 - 6.90 %    Basophils 0.50 0.00 - 1.80 %    Immature Granulocytes 0.50 0.00 - 0.90 %    Nucleated RBC 0.00 /100 WBC    Neutrophils (Absolute) 13.06 (H) 1.82 - 7.42 K/uL    Lymphs (Absolute) 1.06 1.00 - 4.80 K/uL    Monos (Absolute) 0.63 0.00 - 0.85 K/uL    Eos  (Absolute) 0.01 0.00 - 0.51 K/uL    Baso (Absolute) 0.07 0.00 - 0.12 K/uL    Immature Granulocytes (abs) 0.07 0.00 - 0.11 K/uL    NRBC (Absolute) 0.00 K/uL   ESTIMATED GFR    Collection Time: 18  1:28 AM   Result Value Ref Range    GFR If African American >60 >60 mL/min/1.73 m 2    GFR If Non African American >60 >60 mL/min/1.73 m 2   EKG (NOW)    Collection Time: 18  2:13 AM   Result Value Ref Range    Report       [facility] Emergency Dept.    Test Date:  2018  Pt Name:    STONE SALAS               Department: ER  MRN:        [id number]                      Room:        22  Gender:     Male                         Technician: [id number]  :        1985                   Requested By:LILY WELLS  Order #:    [id number]                    Reading MD:    Measurements  Intervals                                Axis  Rate:       90                           P:          56  MN:         164                          QRS:        105  QRSD:       108                          T:          33  QT:         380  QTc:        465    Interpretive Statements  SINUS RHYTHM  PROBABLE LEFT ATRIAL ABNORMALITY  INCOMPLETE RIGHT BUNDLE BRANCH BLOCK  BASELINE WANDER IN LEAD(S) V3  No previous ECG available for comparison     ISTAT ARTERIAL BLOOD GAS    Collection Time: 18  2:19 AM   Result Value Ref Range    Ph 7.339 (L) 7.400 - 7.500    Pco2 48.6 (H) 26.0 - 37.0 mmHg    Po2 248 (H) 64 - 87 mmHg    Tco2 28 20 - 33 mmol/L    S02 100 (H) 93 - 99 %    Hco3 26.2 (H) 17.0 - 25.0 mmol/L    BE 0 -4 - 3 mmol/L    Body Temp 37.4 C degrees    O2 Therapy 100 %    Ph Temp Mahendra 7.333 (L) 7.400 - 7.500    Pco2 Temp Co 49.5 (H) 26.0 - 37.0 mmHg    Po2 Temp Cor 250 (H) 64 - 87 mmHg    Specimen Arterial     Action Range Triggered NO     Inst. Qualified Patient YES        Imaging:  [id number] (1 VIEW)   Final Result      1.  Alveolar pulmonary edema, less likely pneumonia   2.  Line and tube  position as described above      CT-HEAD W/O   Final Result      Head CT without contrast within normal limits. No evidence of acute cerebral infarction, hemorrhage or mass lesion.      ECHOCARDIOGRAM COMP W/O CONT    (Results Pending)           EKG : personally reviewed by me sinus rhythm    All pertinent features of laboratory and imaging reviewed including primary images where applicable    Assessment / Plan:  Ventricular arrhythmia status post shock by AED  Agree with amiodarone for now  Replete potassium    Mechanical aortic valve replacement  Anticoagulate  May need fluoroscopy or LUZMARIA to evaluate leaflet motion with his heart soft heart sounds we will get an echo    It is my pleasure to participate in the care of Mr. Daniel.  Please do not hesitate to contact me with questions or concerns.    Van Oliveira MD PhD [facility]  Cardiologist [facility]    8/29/2018

## 2018-08-29 NOTE — ED NOTES
BIB flight from [facility] for cardiac arrest / unresponsive. Patient was at [place], found unresponsive, unknown downtime, CPR started, shocked x2 by AED, unknown rhythm. ROSC achieved with pulses. Pt became A&O and answering questions appropriately. However, patient had an impending sense of doom at previous facility and was intubated. Sent to [facility] for [facility] care. Hx of TIA, aortic valve stenosis and replacement. On Coumadin. No family or friends at bedside.    ETT 7.0, 22@lip. Ventilator. NGT right nare. Sinus rhythm @triage.     See Interim Patient Care Report for further documentation and medications administered PTA.

## 2018-08-29 NOTE — H&P
Internal Medicine Admitting History and Physical    Note Author: Nasim Ricketts M.D.       Name Nasim Daniel 1985   Age/Sex 33 y.o. male   MRN [id number]   Code Status FULL     After 5PM or if no immediate response to page, please call for cross-coverage  Attending/Team: TRESA Garcia See Patient List for primary contact information  Call [phone] to page     2nd Call - Day Sr. Resident (R2/R3):   ICU Gold       Chief Complaint:   Cardiac arrest    HPI:  History gathered from ERP as patient is currently intubated. Patient is 33 y M w/ PMHx aortic stenosis s/p aortic valve replacement. Patient was at [facility], had cardiac arrest and required AED shock x2. Patient was intubated and started on amiodarone drip. He was then transferred to [facility] in stable condition. No other records available.     Review of Systems   Unable to perform ROS: Acuity of condition             Past Medical History (Chronic medical problem, known complications and current treatment)    Aortic stenosis s/p aortic valve replacement     Past Surgical History:  No past surgical history on file.    Current Outpatient Medications:  Home Medications    **Home medications have not yet been reviewed for this encounter**         Medication Allergy/Sensitivities:  Allergies   Allergen Reactions   • Penicillins    • Zithromax [Azithromycin]          Family History (mandatory)   No family history on file.    Social History (mandatory)   Social History     Social History   • Marital status: N/A     Spouse name: N/A   • Number of children: N/A   • Years of education: N/A     Occupational History   • Not on file.     Social History Main Topics   • Smoking status: Not on file   • Smokeless tobacco: Not on file   • Alcohol use Not on file   • Drug use: Unknown   • Sexual activity: Not on file     Other Topics Concern   • Not on file     Social History Narrative   • No narrative on file     PCP : No primary care provider on  "file.    Physical Exam     Vitals:    08/29/18 0230 08/29/18 0245 08/29/18 0300 08/29/18 0333   BP:       Pulse: 92 88 92    Resp: (!) 23 (!) 22 (!) 24    Temp:       SpO2: 100% 100% 99%    Weight:       Height:    1.727 m (5' 8\")     Body mass index is 23.46 kg/m².  /79   Pulse 92   Temp 37.4 °C (99.3 °F)   Resp (!) 24   Ht 1.727 m (5' 8\")   Wt 70 kg (154 lb 5.2 oz)   SpO2 99%   BMI 23.46 kg/m²   O2 therapy: Pulse Oximetry: 99 %, O2 Delivery: Ventilator    Physical Exam   Constitutional:   Intubated, sedated   Eyes: Pupils are equal, round, and reactive to light. Conjunctivae are normal. No scleral icterus.   Neck: Neck supple.   Cardiovascular: Normal rate, regular rhythm and intact distal pulses.    Pulmonary/Chest: He has no wheezes. He has no rales.   Clear anterior and lateral lung sounds, central sternotomy scar   Abdominal: Soft. He exhibits no distension. There is no rebound and no guarding.   Musculoskeletal: He exhibits no edema.   Psychiatric:   sedated         Data Review       Old Records Request:   Deferred  Current Records review/summary: Completed    Lab Data Review:  Recent Results (from the past 24 hour(s))   URINE DRUG SCREEN (TRIAGE)    Collection Time: 08/29/18 12:55 AM   Result Value Ref Range    Amphetamines Urine Negative Negative    Barbiturates Negative Negative    Benzodiazepines Positive (A) Negative    Cocaine Metabolite Negative Negative    Methadone Negative Negative    Opiates Negative Negative    Oxycodone Negative Negative    Phencyclidine -Pcp Negative Negative    Propoxyphene Negative Negative    Cannabinoid Metab Positive (A) Negative   URINALYSIS CULTURE, IF INDICATED    Collection Time: 08/29/18 12:55 AM   Result Value Ref Range    Color Yellow     Character Cloudy (A)     Specific Gravity 1.022 <1.035    Ph 5.0 5.0 - 8.0    Glucose Negative Negative mg/dL    Ketones Negative Negative mg/dL    Protein 100 (A) Negative mg/dL    Bilirubin Negative Negative    " Urobilinogen, Urine 1.0 Negative    Nitrite Negative Negative    Leukocyte Esterase Trace (A) Negative    Occult Blood Large (A) Negative    Micro Urine Req Microscopic    URINE MICROSCOPIC (W/UA)    Collection Time: 08/29/18 12:55 AM   Result Value Ref Range    Epithelial Cells Rare /hpf   URINE DRUG SCREEN    Collection Time: 08/29/18 12:55 AM   Result Value Ref Range    Amphetamines Urine Negative Negative    Barbiturates Negative Negative    Benzodiazepines Positive (A) Negative    Cocaine Metabolite Negative Negative    Methadone Negative Negative    Opiates Negative Negative    Oxycodone Negative Negative    Phencyclidine -Pcp Negative Negative    Propoxyphene Negative Negative    Cannabinoid Metab Positive (A) Negative   COD (ADULT)    Collection Time: 08/29/18  1:15 AM   Result Value Ref Range    ABO Grouping Only B     Rh Grouping Only POS     Antibody Screen-Cod NEG    COMP METABOLIC PANEL    Collection Time: 08/29/18  1:28 AM   Result Value Ref Range    Sodium 141 135 - 145 mmol/L    Potassium 3.8 3.6 - 5.5 mmol/L    Chloride 106 96 - 112 mmol/L    Co2 23 20 - 33 mmol/L    Anion Gap 12.0 (H) 0.0 - 11.9    Glucose 119 (H) 65 - 99 mg/dL    Bun 27 (H) 8 - 22 mg/dL    Creatinine 0.97 0.50 - 1.40 mg/dL    Calcium 8.4 (L) 8.5 - 10.5 mg/dL    AST(SGOT) 40 12 - 45 U/L    ALT(SGPT) 33 2 - 50 U/L    Alkaline Phosphatase 68 30 - 99 U/L    Total Bilirubin 1.0 0.1 - 1.5 mg/dL    Albumin 4.4 3.2 - 4.9 g/dL    Total Protein 6.7 6.0 - 8.2 g/dL    Globulin 2.3 1.9 - 3.5 g/dL    A-G Ratio 1.9 g/dL   TROPONIN    Collection Time: 08/29/18  1:28 AM   Result Value Ref Range    Troponin I 2.05 (H) 0.00 - 0.04 ng/mL   BTYPE NATRIURETIC PEPTIDE    Collection Time: 08/29/18  1:28 AM   Result Value Ref Range    B Natriuretic Peptide 30 0 - 100 pg/mL   TRIGLYCERIDE    Collection Time: 08/29/18  1:28 AM   Result Value Ref Range    Triglycerides 119 0 - 149 mg/dL   CBC WITH DIFFERENTIAL    Collection Time: 08/29/18  1:28 AM   Result  Value Ref Range    WBC 14.9 (H) 4.8 - 10.8 K/uL    RBC 5.66 4.70 - 6.10 M/uL    Hemoglobin 17.5 14.0 - 18.0 g/dL    Hematocrit 50.8 42.0 - 52.0 %    MCV 89.8 81.4 - 97.8 fL    MCH 30.9 27.0 - 33.0 pg    MCHC 34.4 33.7 - 35.3 g/dL    RDW 41.0 35.9 - 50.0 fL    Platelet Count 238 164 - 446 K/uL    MPV 9.4 9.0 - 12.9 fL    Neutrophils-Polys 87.60 (H) 44.00 - 72.00 %    Lymphocytes 7.10 (L) 22.00 - 41.00 %    Monocytes 4.20 0.00 - 13.40 %    Eosinophils 0.10 0.00 - 6.90 %    Basophils 0.50 0.00 - 1.80 %    Immature Granulocytes 0.50 0.00 - 0.90 %    Nucleated RBC 0.00 /100 WBC    Neutrophils (Absolute) 13.06 (H) 1.82 - 7.42 K/uL    Lymphs (Absolute) 1.06 1.00 - 4.80 K/uL    Monos (Absolute) 0.63 0.00 - 0.85 K/uL    Eos (Absolute) 0.01 0.00 - 0.51 K/uL    Baso (Absolute) 0.07 0.00 - 0.12 K/uL    Immature Granulocytes (abs) 0.07 0.00 - 0.11 K/uL    NRBC (Absolute) 0.00 K/uL   ESTIMATED GFR    Collection Time: 18  1:28 AM   Result Value Ref Range    GFR If African American >60 >60 mL/min/1.73 m 2    GFR If Non African American >60 >60 mL/min/1.73 m 2   EKG (NOW)    Collection Time: 18  2:13 AM   Result Value Ref Range    Report       [facility] Emergency Dept.    Test Date:  2018  Pt Name:    STONE SALAS               Department: ER  MRN:        [id number]                      Room:        22  Gender:     Male                         Technician: [id number]  :        1985                   Requested By:LILY WELLS  Order #:    [id number]                    Sari MD:    Measurements  Intervals                                Axis  Rate:       90                           P:          56  MA:         164                          QRS:        105  QRSD:       108                          T:          33  QT:         380  QTc:        465    Interpretive Statements  SINUS RHYTHM  PROBABLE LEFT ATRIAL ABNORMALITY  INCOMPLETE RIGHT BUNDLE BRANCH BLOCK  BASELINE WANDER IN LEAD(S)  V3  No previous ECG available for comparison     ISTAT ARTERIAL BLOOD GAS    Collection Time: 08/29/18  2:19 AM   Result Value Ref Range    Ph 7.339 (L) 7.400 - 7.500    Pco2 48.6 (H) 26.0 - 37.0 mmHg    Po2 248 (H) 64 - 87 mmHg    Tco2 28 20 - 33 mmol/L    S02 100 (H) 93 - 99 %    Hco3 26.2 (H) 17.0 - 25.0 mmol/L    BE 0 -4 - 3 mmol/L    Body Temp 37.4 C degrees    O2 Therapy 100 %    Ph Temp Mahendra 7.333 (L) 7.400 - 7.500    Pco2 Temp Co 49.5 (H) 26.0 - 37.0 mmHg    Po2 Temp Cor 250 (H) 64 - 87 mmHg    Specimen Arterial     Action Range Triggered NO     Inst. Qualified Patient YES    PROTHROMBIN TIME    Collection Time: 08/29/18  2:47 AM   Result Value Ref Range    PT 31.2 (H) 12.0 - 14.6 sec    INR 3.04 (H) 0.87 - 1.13   APTT    Collection Time: 08/29/18  2:47 AM   Result Value Ref Range    APTT 30.7 24.7 - 36.0 sec   MAGNESIUM    Collection Time: 08/29/18  2:47 AM   Result Value Ref Range    Magnesium 2.0 1.5 - 2.5 mg/dL   PHOSPHORUS    Collection Time: 08/29/18  2:47 AM   Result Value Ref Range    Phosphorus 3.8 2.5 - 4.5 mg/dL   ABO AND RH CONFIRMATION    Collection Time: 08/29/18  2:47 AM   Result Value Ref Range    ABO Confirm B     Second Rh Group POS        Imaging/Procedures Review:    Independant Imaging Review: Completed  DX-CHEST-PORTABLE (1 VIEW)   Final Result      1.  Alveolar pulmonary edema, less likely pneumonia   2.  Line and tube position as described above      CT-HEAD W/O   Final Result      Head CT without contrast within normal limits. No evidence of acute cerebral infarction, hemorrhage or mass lesion.      ECHOCARDIOGRAM COMP W/O CONT    (Results Pending)            EKG:   EKG Independant Review: Completed  QTc:465, HR: 90, Normal Sinus Rhythm, no ST/T changes    Records reviewed and summarized in current documentation :  Yes  [employer] teaching service handout given to patient:  No         Assessment/Plan     * Cardiac arrest (HCC)- (present on admission)   Assessment & Plan    - Transferred  from [facility], no records available  - Had cardiac arrest, s/p AED shocks x2 with ROSC  - Started on amiodarone drip prior to arrival  - Has remained in NSR, continue amiodarone drip  - Cardiology consulted, echo pending        Aortic stenosis- (present on admission)   Assessment & Plan    - S/p aortic valve replacement, on coumadin?  - Heparin drip for anticoagulation  - Cardiology consulted, echo pending        Ventilator dependent (HCC)- (present on admission)   Assessment & Plan    - Intubated prior to arrival  - CXR, echocardiogram ordered  - sedated, attempt daily SBT        Elevated troponin- (present on admission)   Assessment & Plan    - Likely secondary to ventricular arrhythmia  - EKG without ischemic changes  - Trend troponin q6h            Anticipated Hospital stay:  >2 midnights        Quality Measures  Quality-Core Measures   Reviewed items::  Labs reviewed, EKG reviewed, Medications reviewed and Radiology images reviewed  DVT: heparin anticoagulation.    PCP: No primary care provider on file.

## 2018-08-29 NOTE — ED PROVIDER NOTES
ED Provider Note    ED Provider Note      Primary care provider: No primary care provider on file.    CHIEF COMPLAINT  Chief Complaint   Patient presents with   • Cardiac Arrest       HPI  Nasim Daniel is a 33 y.o. male who presents to the Emergency Department with chief complaint of cardiac arrest.  Per EMS report patient was found in the large group of people found down.  Patient was connected to an AED and 2 shocks were delivered.  Patient had improvement in mental status at that time apparently was ANO ×4.  [place] was taken to their on-site clinic patient apparently had return of altered mental status and questionable whether he had recurrence of ventricular arrhythmia.  Patient was given 150 mg of amiodarone and was again on amiodarone drip.  There was concern for airway protection and he was intubated prior to transport.  Reported recent past medical history of aortic stenosis status post mechanical aortic valve replacement.  Patient was also given several doses of Versed and fentanyl in transport.    REVIEW OF SYSTEMS  As per HPI otherwise limited by critical condition and intubation/sedation      Past medical surgical social family history meds and allergies from EMS report and chart review.    PAST MEDICAL HISTORY   Aortic stenosis    SURGICAL HISTORY  Aortic valve replacement    SOCIAL HISTORY  Reported marijuana use denied other drugs tobacco or alcohol      FAMILY HISTORY  Non-Contributory    CURRENT MEDICATIONS  Coumadin    ALLERGIES  Allergies   Allergen Reactions   • Penicillins    • Zithromax [Azithromycin]        PHYSICAL EXAM  VITAL SIGNS: /79   Pulse 87   Resp (!) 22   Wt 70 kg (154 lb 5.2 oz)   SpO2 100%   Pulse ox interpretation: Appropriate oxygenation on mechanical ventilation  Constitutional: Intubated and sedated some gagging and purposeful movement with upper and lower extremities  HEENT: Atraumatic normocephalic, pupils are equal round reactive to  light . The nares is clear, external ears are normal, mouth shows moist mucous membranes, minimal blood within the oropharynx and minimal blood in ET tube  Neck: Supple, no JVD no tracheal deviation  Cardiovascular: Regular rate and rhythm mechanical click 2+ pulses peripherally x4  Thorax & Lungs: Intubated, no appreciable adventitious sounds over mechanical ventilation, fresh appearing midline sternotomy scar  GI: Soft  nondistended positive bowel sounds  Skin: Warm dry no acute rash or lesion  Musculoskeletal: No obvious acute deformities.  Occasional purposeful movement in all 4 extremities with decreasing sedation  Neurologic: Intubated and sedated  Psychiatric: Intubated and sedated      DIAGNOSTIC STUDIES / PROCEDURES  LABS      Results for orders placed or performed during the hospital encounter of 08/29/18   COMP METABOLIC PANEL   Result Value Ref Range    Sodium 141 135 - 145 mmol/L    Potassium 3.8 3.6 - 5.5 mmol/L    Chloride 106 96 - 112 mmol/L    Co2 23 20 - 33 mmol/L    Anion Gap 12.0 (H) 0.0 - 11.9    Glucose 119 (H) 65 - 99 mg/dL    Bun 27 (H) 8 - 22 mg/dL    Creatinine 0.97 0.50 - 1.40 mg/dL    Calcium 8.4 (L) 8.5 - 10.5 mg/dL    AST(SGOT) 40 12 - 45 U/L    ALT(SGPT) 33 2 - 50 U/L    Alkaline Phosphatase 68 30 - 99 U/L    Total Bilirubin 1.0 0.1 - 1.5 mg/dL    Albumin 4.4 3.2 - 4.9 g/dL    Total Protein 6.7 6.0 - 8.2 g/dL    Globulin 2.3 1.9 - 3.5 g/dL    A-G Ratio 1.9 g/dL   PROTHROMBIN TIME   Result Value Ref Range    PT 31.2 (H) 12.0 - 14.6 sec    INR 3.04 (H) 0.87 - 1.13   APTT   Result Value Ref Range    APTT 30.7 24.7 - 36.0 sec   TROPONIN   Result Value Ref Range    Troponin I 2.05 (H) 0.00 - 0.04 ng/mL   BTYPE NATRIURETIC PEPTIDE   Result Value Ref Range    B Natriuretic Peptide 30 0 - 100 pg/mL   URINE DRUG SCREEN (TRIAGE)   Result Value Ref Range    Amphetamines Urine Negative Negative    Barbiturates Negative Negative    Benzodiazepines Positive (A) Negative    Cocaine Metabolite  Negative Negative    Methadone Negative Negative    Opiates Negative Negative    Oxycodone Negative Negative    Phencyclidine -Pcp Negative Negative    Propoxyphene Negative Negative    Cannabinoid Metab Positive (A) Negative   URINALYSIS CULTURE, IF INDICATED   Result Value Ref Range    Color Yellow     Character Cloudy (A)     Specific Gravity 1.022 <1.035    Ph 5.0 5.0 - 8.0    Glucose Negative Negative mg/dL    Ketones Negative Negative mg/dL    Protein 100 (A) Negative mg/dL    Bilirubin Negative Negative    Urobilinogen, Urine 1.0 Negative    Nitrite Negative Negative    Leukocyte Esterase Trace (A) Negative    Occult Blood Large (A) Negative    Micro Urine Req Microscopic    COD (ADULT)   Result Value Ref Range    ABO Grouping Only B     Rh Grouping Only POS     Antibody Screen-Cod NEG    URINE MICROSCOPIC (W/UA)   Result Value Ref Range    Epithelial Cells Rare /hpf   URINE DRUG SCREEN   Result Value Ref Range    Amphetamines Urine Negative Negative    Barbiturates Negative Negative    Benzodiazepines Positive (A) Negative    Cocaine Metabolite Negative Negative    Methadone Negative Negative    Opiates Negative Negative    Oxycodone Negative Negative    Phencyclidine -Pcp Negative Negative    Propoxyphene Negative Negative    Cannabinoid Metab Positive (A) Negative   TRIGLYCERIDE   Result Value Ref Range    Triglycerides 119 0 - 149 mg/dL   CBC WITH DIFFERENTIAL   Result Value Ref Range    WBC 14.9 (H) 4.8 - 10.8 K/uL    RBC 5.66 4.70 - 6.10 M/uL    Hemoglobin 17.5 14.0 - 18.0 g/dL    Hematocrit 50.8 42.0 - 52.0 %    MCV 89.8 81.4 - 97.8 fL    MCH 30.9 27.0 - 33.0 pg    MCHC 34.4 33.7 - 35.3 g/dL    RDW 41.0 35.9 - 50.0 fL    Platelet Count 238 164 - 446 K/uL    MPV 9.4 9.0 - 12.9 fL    Neutrophils-Polys 87.60 (H) 44.00 - 72.00 %    Lymphocytes 7.10 (L) 22.00 - 41.00 %    Monocytes 4.20 0.00 - 13.40 %    Eosinophils 0.10 0.00 - 6.90 %    Basophils 0.50 0.00 - 1.80 %    Immature Granulocytes 0.50 0.00 - 0.90  %    Nucleated RBC 0.00 /100 WBC    Neutrophils (Absolute) 13.06 (H) 1.82 - 7.42 K/uL    Lymphs (Absolute) 1.06 1.00 - 4.80 K/uL    Monos (Absolute) 0.63 0.00 - 0.85 K/uL    Eos (Absolute) 0.01 0.00 - 0.51 K/uL    Baso (Absolute) 0.07 0.00 - 0.12 K/uL    Immature Granulocytes (abs) 0.07 0.00 - 0.11 K/uL    NRBC (Absolute) 0.00 K/uL   MAGNESIUM   Result Value Ref Range    Magnesium 2.0 1.5 - 2.5 mg/dL   PHOSPHORUS   Result Value Ref Range    Phosphorus 3.8 2.5 - 4.5 mg/dL   ABO AND RH CONFIRMATION   Result Value Ref Range    ABO Confirm B     Second Rh Group POS    ESTIMATED GFR   Result Value Ref Range    GFR If African American >60 >60 mL/min/1.73 m 2    GFR If Non African American >60 >60 mL/min/1.73 m 2   EKG (NOW)   Result Value Ref Range    Report       [facility] Emergency Dept.    Test Date:  2018  Pt Name:    STONE HULL               Department: ER  MRN:        [id number]                      Room:       [facility]  Gender:     M                            Technician: [id number]  :        1985                   Requested By:LILY WELLS  Order #:    [id number]                    Reading MD: LILY WELLS MD    Measurements  Intervals                                Axis  Rate:       90                           P:          56  NM:         164                          QRS:        105  QRSD:       108                          T:          33  QT:         380  QTc:        465    Interpretive Statements  SINUS RHYTHM  PROBABLE LEFT ATRIAL ABNORMALITY  INCOMPLETE RIGHT BUNDLE BRANCH BLOCK  BASELINE WANDER IN LEAD(S) V3  No previous ECG available for comparison    Electronically Signed On 2018 5:35:16 PDT by LILY WELLS MD     ISTAT ARTERIAL BLOOD GAS   Result Value Ref Range    Ph 7.339 (L) 7.400 - 7.500    Pco2 48.6 (H) 26.0 - 37.0 mmHg    Po2 248 (H) 64 - 87 mmHg    Tco2 28 20 - 33 mmol/L    S02 100 (H) 93 - 99 %    Hco3 26.2 (H) 17.0 - 25.0 mmol/L    BE 0 -4 - 3  mmol/L    Body Temp 37.4 C degrees    O2 Therapy 100 %    Ph Temp Mahendra 7.333 (L) 7.400 - 7.500    Pco2 Temp Co 49.5 (H) 26.0 - 37.0 mmHg    Po2 Temp Cor 250 (H) 64 - 87 mmHg    Specimen Arterial     Action Range Triggered NO     Inst. Qualified Patient YES        All labs reviewed by me.      RADIOLOGY  DX-CHEST-PORTABLE (1 VIEW)   Final Result      1.  Alveolar pulmonary edema, less likely pneumonia   2.  Line and tube position as described above      CT-HEAD W/O   Final Result      Head CT without contrast within normal limits. No evidence of acute cerebral infarction, hemorrhage or mass lesion.      ECHOCARDIOGRAM COMP W/O CONT    (Results Pending)     The radiologist's interpretation of all radiological studies have been reviewed by me.    COURSE & MEDICAL DECISION MAKING  Pertinent Labs & Imaging studies reviewed. (See chart for details)    12:48 AM - Patient seen and examined at bedside.  Orders placed to continue amiodarone drip to initiate propofol drip.  Orders placed for CT head chest x-ray as well as complete laboratory evaluation.        Patient noted to have slightly elevated blood pressure likely circumstantial secondary to presenting complaint. Referred to primary care physician for further evaluation.        Medical Decision Makin-year-old male with presumed ventricular arrest apparently shocked twice with automated external defibrillator.  Patient arrived to our hospital intubated sedated was kept on sedation.  CT head negative chest x-ray shows some pulmonary alveolar edema troponin slightly elevated at 2.05 EKG nonischemic at this time.  Patient did have purposeful movement in all 4 extremities when sedation would wean not candidate for code chill at this time.  Patient will be admitted to the ICU service for ongoing management was discussed with the [facility] internal medicine critical care team attending Dr. Wilkins resident Dr. Ricketts.  Cardiology was also contacted.  Patient admitted to the ICU  "in critical condition.    /79   Pulse 89   Temp 37.4 °C (99.3 °F)   Resp (!) 22   Ht 1.727 m (5' 8\")   Wt 70 kg (154 lb 5.2 oz)   SpO2 100%   BMI 23.46 kg/m²         FINAL IMPRESSION  1.  Post cardiac arrest,  probable ventricular  2.  Ventilator dependent respiratory failure  3.  Elevated troponin  4.  Leukocytosis  5.  Pulmonary edema  6.  Anticoagulated with appropriate INR      This dictation has been created using voice recognition software and/or scribes. The accuracy of the dictation is limited by the abilities of the software and the expertise of the scribes. I expect there may be some errors of grammar and possibly content. I made every attempt to manually correct the errors within my dictation. However, errors related to voice recognition software and/or scribes may still exist and should be interpreted within the appropriate context.            "

## 2018-08-29 NOTE — ED NOTES
" and close friend at bedside. Close friend reports that patient was doing a dancing performance and he finished in a position on the ground. Close friend reports witnessed patient \"go down\" and she noticed that he wasn't moving but had his mouth opened. Went to patient and yelled for help. There was an MD in the audience who began CPR within a few minutes of patient becoming unresponsive. Sts patient was shocked within 5-15minutes after becoming unresponsive.   "

## 2018-08-29 NOTE — DIETARY
"Nutrition Support Assessment     Day 0 of admit.  Nasim Bo is a 33 y.o. male with admitting DX of Cardiac arrest     Current problem list:  Patient was at [place], found unresponsive, unknown downtime, CPR started, shocked x2 by AED, unknown rhythm. ROSC achieved with pulses. Pt became A&O and answering questions appropriately. However, patient had an impending sense of doom at previous facility and was intubated. Sent to [facility] for cont care. Hx of TIA, aortic valve stenosis and replacement. On Coumadin.     Assessment:  Estimated Nutritional Needs based on:   Height: 172.7 cm (5' 8\")  Weight: 70 kg (154 lb 5.2 oz)  Ideal Body Weight: 69.9 kg (154 lb)  Percent Ideal Body Weight: 100.2  Body mass index is 23.46 kg/m².     Calculation/Equation: REE per MSJ x1.2 = 1939 kcal/day; RMR per [facility] (vent L/min 10, T max/24 hours 37.9) = 1977 kcal/day  Total Calories / day: 1900 - 2100 (Calories / k - 30)  Total Grams Protein / day: 84 - 105 (Grams Protein / k.2 - 1.5)     Evaluation:   1. Pt is intubated and unable to take PO diet.  2. Await Cortrak placement for TF to start.   3. Labs reviewed.  4. Meds: D5W @ 30 ml/hr, LR @ 100 ml/hr, electrolyte replacement, propofol @ 55 mcg/kg/min (23.1 ml/hr = 610 kcal/day), bowel meds  5. Low-volume TF formula is indicated for pt with EF 35%.    Malnutrition Risk: No nutritional admit screen.     Recommendations/Plan:  1. Start Impact Peptide 1.5 @ 25 ml/hr and advance per protocol to goal rate with propofol 40 ml/hr to provide 1440 kcal (+Kcal from propofol), 90 grams protein, and 739 ml free water per day.  2. When propofol D/c'd, increase TF to final goal rate 55 ml/hr to provide 1980 kcal, 124 grams protein, and 1016 ml free water per day.  3. Fluids per MD.    RD following.  "

## 2018-08-29 NOTE — ASSESSMENT & PLAN NOTE
- Likely secondary to ventricular arrhythmia and CPR/Shock  - EKG without ischemic changes  -Trop came down, stopped trending

## 2018-08-29 NOTE — ASSESSMENT & PLAN NOTE
- Intubated prior to arrival for concern of airway protection   - CXR shows pulmonary edema, likely due to arrhythmia and arrest, no evidence of consolidaton   -  echocardiogram showed dilated IVC and reduced EF 35%   - extubated 8/29, and weaned off oxygen.  -Satting at 95% on room air at time of discharge.

## 2018-08-29 NOTE — CONSULTS
Critical Care/Pulmonary Consultation    Date of service: 8/29/2018    Consulting Physician: Rajan Cao M.D.    Chief Complaint: Cardiac arrest     History of Present Illness: Nasim Daniel is a 33 y.o. male with a past medical history of aortic stenosis status post aortic valve replacement, on Coumadin, was brought to the hospital after out of hospital cardiac arrest with source CPR and AED defibrillation x 2, and was endotracheally intubated by EMS in route to the hospital impending doom.  Started on amiodarone for presumed ventricular arrhythmia for out of hospital cardiac arrest.  ICU consult was requested for evaluation and management.    ROS: Unable to obtain because patient is sedated.    No current facility-administered medications on file prior to encounter.      No current outpatient prescriptions on file prior to encounter.       Social History   Substance Use Topics   • Smoking status: Not on file   • Smokeless tobacco: Not on file   • Alcohol use Not on file        No past medical history on file.    No past surgical history on file.    Allergies: Penicillins and Zithromax [azithromycin]    No family history on file.    Vitals:    08/29/18 0000 08/29/18 0045 08/29/18 0046 08/29/18 0100   Weight: 70 kg (154 lb 5.2 oz) 70 kg (154 lb 5.2 oz)     Weight % change since last entry.: 0 % 0 %     BP:   111/79    Pulse:   87 85   Resp:  20 (!) 22 (!) 24   Temp:        08/29/18 0115 08/29/18 0130 08/29/18 0213 08/29/18 0215   Weight:       Weight % change since last entry.:       BP:       Pulse: 89 88 81 93   Resp: (!) 23 (!) 22 (!) 24 18   Temp:        08/29/18 0220 08/29/18 0221   Weight:     Weight % change since last entry.:     BP:     Pulse:  92   Resp:  (!) 23   Temp: 37.4 °C (99.3 °F)        Physical Examination  General: Average built  Neuro/Psych: Sedated, withdraws all extremities to pain  HEENT: Head is normocephalic, atraumatic, pupils 3 mm bilaterally reactive to light  CVS: Unremarkable  midsternal scar, S1-S2 within normal limits, regular rate, no murmurs rubs or gallops, no peripheral lower extremities edema  Respiratory: Oral endotracheally intubated, symmetric expansion of the chest with respirations, trachea is midline, clear breath sounds bilaterally  Abdomen/: Soft, nondistended, no guarding to palpation  Extremities: No deformities, joint swelling, joint erythema  Skin: No skin rashes, bruises, jaundice    Recent Labs      08/29/18   0128   WBC  14.9*   NEUTSPOLYS  87.60*   LYMPHOCYTES  7.10*   MONOCYTES  4.20   EOSINOPHILS  0.10   BASOPHILS  0.50   ASTSGOT  40   ALTSGPT  33   ALKPHOSPHAT  68   TBILIRUBIN  1.0     Recent Labs      08/29/18   0128   SODIUM  141   POTASSIUM  3.8   CHLORIDE  106   CO2  23   BUN  27*   CREATININE  0.97   CALCIUM  8.4*     Recent Labs      08/29/18   0128   ALTSGPT  33   ASTSGOT  40   ALKPHOSPHAT  68   TBILIRUBIN  1.0   GLUCOSE  119*     Recent Labs      08/29/18   0219   ISTATAPH  7.339*   ISTATAPCO2  48.6*   ISTATAPO2  248*   ISTATATCO2  28   [id number]  100*   ISTATARTHCO3  26.2*   ISTATARTBE  0   ISTATTEMP  37.4 C   ISTATFIO2  100   ISTATSPEC  Arterial   ISTATAPHTC  7.333*   [id number]  250*     DX-CHEST-PORTABLE (1 VIEW)   Final Result      1.  Alveolar pulmonary edema, less likely pneumonia   2.  Line and tube position as described above      CT-HEAD W/O   Final Result      Head CT without contrast within normal limits. No evidence of acute cerebral infarction, hemorrhage or mass lesion.      ECHOCARDIOGRAM COMP W/O CONT    (Results Pending)       Assessment and Plan:    1.  Out of hospital cardiac arrest  -Status post short CPR and AED defibrillation ×2 with rosc  -IV amiodarone drip  -Monitoring troponin trend  -Cardiology consult is pending  -2D echo is pending    2.  History of aortic stenosis status post replacement, on Coumadin  -2D echo is pending  -Cardiology consult is pending  -INR is pending  - Anticoagulation with heparin    3.  Mechanical  assisted ventilation  -Lung protective mechanical ventilation  -Daily spontaneous awakening and spontaneous breathing trial  -Pulmonary edema/?  Pneumonitis  -Hold on antibiotics for now and monitor for clinical signs of pneumonia prior to initiation of ABX treatment  -Bronchodilators  -ABG in a.m.    4.  ICU sedation  -Propofol    5.  ICU prophylaxis  -Heparin     Family meeting: Not available at bedside    Critical care time: I spent 35 minutes personally providing critical care services including formulating plan of care.  This time was exclusive to this patient and does not include time spent in procedures.

## 2018-08-29 NOTE — ASSESSMENT & PLAN NOTE
Mild VONDA on admission, baseline renal function not known.   -At admission elevated BUN/Cr 27/0.87 suggesting pre-renal   - possibly due to cardiac event vs dehydration from being out at [employer]   -At time of discharge, BUN 10, creatinine 0.7.  -Resolved

## 2018-08-29 NOTE — PROGRESS NOTES
2 RN skin check. No outstanding marks. Pt has scar on chest from previous heart surgery and minor scabs on right foot

## 2018-08-29 NOTE — PROGRESS NOTES
Pulmonary Critical Care Progress Note      Admit Date: 8/29/2018    Chief Complaint: OOH Cardiac Arrest    History of Present Illness: 33 y.o. male with a past medical history of aortic stenosis status post aortic valve replacement, on Coumadin, was brought to the hospital after out of hospital cardiac arrest with source CPR and AED defibrillation x 2, and was endotracheally intubated by EMS in route to the hospital impending doom.  Started on amiodarone for presumed ventricular arrhythmia for out of hospital cardiac arrest.  ICU consult was requested for evaluation and management.      Interval Events:  24 hour interval history reviewed   Tm 100.2  -334cc over last 24hr  Cxr with b/l fluffy opacities - likely pulm edema  Wbc 14.9  K 3.8  Mg 2  trop2  utox w benzos/thc  Abg 7.36/42/156/99 on 60%    Amiodarone infusion  Heparin infusion  LR @ 100  Propofol @ 80    Addendum:  Echo reviewed, pressures holding off pressors, ivf will be stopped at this juncture with pulm edema + ivc findings of volume o/l.     Addendum:  Will transition to precedex in order to minimize negative inotropic effects of propofol. Continue vent titration, serial neuro exams, and continuous hemodynamic monitoring.      Review of Systems   Unable to perform ROS: Acuity of condition     Physical Exam   Constitutional: He appears well-developed and well-nourished.   HENT:   Head: Normocephalic and atraumatic.   Eyes: Pupils are equal, round, and reactive to light. Conjunctivae are normal.   Neck: No tracheal deviation present.   Cardiovascular: Normal rate and intact distal pulses.    Pulmonary/Chest: He has no wheezes. He has no rales.   Abdominal: Soft. He exhibits no distension. There is no tenderness.   Musculoskeletal: He exhibits no edema.   Neurological: No cranial nerve deficit.   Skin: Skin is warm and dry.   Psychiatric:   Unable to assess   Nursing note and vitals reviewed.      PFSH:  No change.    Respiratory:  [place] Vent Mode:  APVCMV, Rate (breaths/min): 24, Vt Target (mL): 420, PEEP/CPAP: 8, FiO2: 40, Static Compliance (ml / cm H2O): 36.3, Control VTE (exp VT): 422  Pulse Oximetry: 97 %  Chest Tube Drains:            Recent Labs      08/29/18   0219  08/29/18   0522   ISTATAPH  7.339*  7.360*   ISTATAPCO2  48.6*  42.7*   ISTATAPO2  248*  156*   ISTATATCO2  28  25   [id number]  100*  99   ISTATARTHCO3  26.2*  24.1   ISTATARTBE  0  -1   ISTATTEMP  37.4 C  37.9 C   ISTATFIO2  100  60   ISTATSPEC  Arterial  Arterial   ISTATAPHTC  7.333*  7.347*   [id number]  250*  162*       HemoDynamics:  Pulse: 90, Heart Rate (Monitored): 88  Blood Pressure: 111/79, NIBP: (!) 96/68         Recent Labs      08/29/18   0128   TROPONINI  2.05*   BNPBTYPENAT  30       Neuro:  GCS             Fluids:  Intake/Output       08/27/18 0700 - 08/28/18 0659 (Not Admitted) 08/28/18 0700 - 08/29/18 0659 (Not Admitted) 08/29/18 0700 - 08/30/18 0659      0671-1176 1964-2635 Total 2557-7021 0445-8810 Total 5166-9019 5225-2833 Total       Intake    P.O.  --  -- --  --  0 0  --  -- --    P.O. -- -- -- -- 0 0 -- -- --    I.V.  --  -- --  --  225.9 225.9  --  -- --    Amiodarone Volume -- -- -- -- 150.7 150.7 -- -- --    Propofol Volume -- -- -- -- 75.2 75.2 -- -- --    Total Intake -- -- -- -- 225.9 225.9 -- -- --       Output    Urine  --  -- --  --  560 560  --  -- --    Output (mL) (Urinary Catheter Indwelling Catheter) -- -- -- -- 560 560 -- -- --    Total Output -- -- -- -- 560 560 -- -- --       Net I/O     -- -- -- -- -334.1 -334.1 -- -- --        Weight: 70 kg (154 lb 5.2 oz)  Recent Labs      08/29/18 0128 08/29/18 0247   SODIUM  141   --    POTASSIUM  3.8   --    CHLORIDE  106   --    CO2  23   --    BUN  27*   --    CREATININE  0.97   --    MAGNESIUM   --   2.0   PHOSPHORUS   --   3.8   CALCIUM  8.4*   --        GI/Nutrition:    Liver Function  Recent Labs      08/29/18 0128   ALTSGPT  33   ASTSGOT  40   ALKPHOSPHAT  68   TBILIRUBIN  1.0   GLUCOSE  119*        Heme:  Recent Labs      188  18   0247   RBC  5.66   --    HEMOGLOBIN  17.5   --    HEMATOCRIT  50.8   --    PLATELETCT  238   --    PROTHROMBTM   --   31.2*   APTT   --   30.7   INR   --   3.04*       Infectious Disease:  Monitored Temp 2  Av.7 °C (99.9 °F)  Min: 37.4 °C (99.3 °F)  Max: 37.9 °C (100.2 °F)  Temp  Av.7 °C (99.9 °F)  Min: 37.4 °C (99.3 °F)  Max: 37.9 °C (100.2 °F)  Micro: cultures reviewed  Recent Labs      18   WBC  14.9*   NEUTSPOLYS  87.60*   LYMPHOCYTES  7.10*   MONOCYTES  4.20   EOSINOPHILS  0.10   BASOPHILS  0.50   ASTSGOT  40   ALTSGPT  33   ALKPHOSPHAT  68   TBILIRUBIN  1.0     Current Facility-Administered Medications   Medication Dose Frequency Provider Last Rate Last Dose   • D5W infusion   Continuous Rajan Cao M.D.       • amiodarone (CORDARONE) 450 mg in D5W 250 mL Infusion  0.5-1 mg/min Continuous Rajan Cao M.D. 33 mL/hr at 18 0126 0.99 mg/min at 18 0126   • propofol (DIPRIVAN) injection  0-80 mcg/kg/min Continuous Rajan Cao M.D. 33.6 mL/hr at 18 0610 80 mcg/kg/min at 18 0610   • Respiratory Care per Protocol   Continuous RT Lalo Wilkins M.D.       • senna-docusate (PERICOLACE or SENOKOT S) 8.6-50 MG per tablet 2 Tab  2 Tab BID Lalo Wilkins M.D.   Stopped at 18 0600    And   • polyethylene glycol/lytes (MIRALAX) PACKET 1 Packet  1 Packet QDAY PRN Lalo Wilkins M.D.        And   • magnesium hydroxide (MILK OF MAGNESIA) suspension 30 mL  30 mL QDAY PRN Lalo Wilkins M.D.        And   • bisacodyl (DULCOLAX) suppository 10 mg  10 mg QDAY PRN Lalo Wilkins M.D.       • MD ALERT...Adult ICU Electrolyte Replacement per Pharmacy Protocol   pharmacy to dose Lalo Wilkins M.D.       • lactated ringers infusion   Continuous Lalo Wilkins M.D. 100 mL/hr at 18 0335     • ipratropium-albuterol (DUONEB) nebulizer solution  3 mL Q4H PRN (RT) Lalo Wilkins M.D.        • MD ALERT...HEPARIN WEIGHT BASED PROTOCOL Pharmacist to implement   PRN Lalo Wilkins M.D.       • heparin injection 2,600 Units  2,600 Units PRN Lalo Wilkins M.D.        And   • heparin infusion 25,000 units in 500 ml 0.45% nacl   Continuous Lalo Wilkins M.D.         This patient's medications have not been reviewed.    Quality  Measures:  Labs reviewed, Medications reviewed and Radiology images reviewed                      Assessment/Plan:  -  Acute Hypoxic Respiratory Failure   - intubated 8/29   - continue full vent support   - I am adjusting vent based on abg/pulm mechanics   - monitor volume balance    -  Out of hospital cardiac arrest - VF/VT   - no hypothermia due to following/purposeful   - s/p cpr + defib x 2 w ROSC   - continue amiodarone infusion   - echo pending read   - EP eval for ICD +/- Diagnostic cath     -  Hx of AS with mAVR   - therapeutic on coumadin   - currently transitioning to heparin    -  Leukocytosis   - likely reactive   - follow for clinical sings of infection    -  Hypokalemia   - I am replacing to keep > 4    Incomplete medical database            Discussed patient condition and risk of morbidity and/or mortality with RN, RT, Therapies, Pharmacy and cardiology.    The patient remains critically ill.  Critical care time = 77 minutes in directly providing and coordinating critical care and extensive data review.  No time overlap and excludes procedures.

## 2018-08-29 NOTE — PROGRESS NOTES
[facility] ICU Progress Note      Admit Date: 8/29/2018  ICU Day: 1    Resident(s): Martita Brown  Attending: SHERLEY GIMENEZ/ Dr. Irving    Date & Time:   8/29/2018   10:59 AM       Patient ID:    Name:             Nasim Bo     YOB: 1985  Age:                 33 y.o.  male   MRN:               [id number]    HPI:    34 yo male with PMHx of aortic stenosis s/p mechanical aortic valve replacement in 2003 on coumadin and 2 previous TIA events. Patient was attending [employer] and performing when he  Suffered cardiac arrest requiring CPR and 2 AED shocks, time frame of event unclear. He was given loading dose of amiodarone and was intubated outside of hospital. Per chart review and  of the patient who is present prior to intubation patient was alert and oriented but was experiencing significant panic and was intubated due to concern for protection of airway. On admission, patient had elevated troponin of 2. He has been maintained in sinus rhythm on amiodarone drip and was started on Heparin drip for anticoagulation, Cardiology is following. Chest xray showed pulmonary edema. CT head negative of infarction, hemorrhage or mass.   Per , had congenital heart defect with multiple surgeries in the past. 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 He denies any drug use prior to event. Denies any known history of sudden cardiac death in family.     Consultants:  Cardiology: Dr. Toro   PMA: Dr. Irving     Procedures:  Intubation 8/29    Interval Events:    Admitted overnight, on vent Day #1 CMV 24/420/8/40%, SBT today   Prop at 40, follows commands off sedation   SR 80-90's, SBP 's, Tmax 100.2  Continue amio drip per cards recs,  pt will need ICD prior to discharge  Start tube feeds    INR 3, stop heparin will restart when INR  <2  Echocardiogram pending       Imaging:  ECHOCARDIOGRAM COMP W/O CONT   Final Result      DX-CHEST-PORTABLE (1 VIEW)   Final Result      1.  Alveolar pulmonary edema, less likely pneumonia   2.  Line and tube position as described above      CT-HEAD W/O   Final Result      Head CT without contrast within normal limits. No evidence of acute cerebral infarction, hemorrhage or mass lesion.      DX-ABDOMEN FOR TUBE PLACEMENT    (Results Pending)         Review of Systems   Unable to perform ROS: Intubated       PHYSICAL EXAM  Gen: young male, well nourished, sedated and intubated, NAD  HEENT: NC/AT, no scleral icterus, no conjunctival injection, mucous membranes pink.  Neck: Supple, no lymphadenopathy.  Cardiac: S1S2, RRR, 3/6 systolic murmur, no JVD.  Respiratory: Normal effort, symmetrical, CTA b/l.  Abdomen: BS+, soft, NT/ND, no rebound/guarding, no palpable organomegaly.  Ext: No edema, 2+ DP pulses b/l.  Skin: Warm, dry. No rashes or erythema.   Neuro:  Sedated on vent, moving all extremities .    Respiratory:  [place] Vent Mode: APVCMV  Respiration: 20, Pulse Oximetry: 100 %    Chest Tube Drains:    Recent Labs      08/29/18   0219  08/29/18   0522   ISTATAPH  7.339*  7.360*   ISTATAPCO2  48.6*  42.7*   ISTATAPO2  248*  156*   ISTATATCO2  28  25   [id number]  100*  99   ISTATARTHCO3  26.2*  24.1   ISTATARTBE  0  -1   ISTATTEMP  37.4 C  37.9 C   ISTATFIO2  100  60   ISTATSPEC  Arterial  Arterial   ISTATAPHTC  7.333*  7.347*   [id number]  250*  162*       HemoDynamics:  Pulse: 86, Heart Rate (Monitored): 86 Blood Pressure: 111/79, NIBP: (!) 91/62      Neuro:  Sedated on vent, moving all extremities     Fluids:    Date 08/29/18 0700 - 08/30/18 0659   Shift 5022-5824 7634-4540 9436-4699 24 Hour Total   I  N  T  A  K  E   I.V. 695   695      Amiodarone Volume 132   132      Propofol Volume 103   103      IV Volume (D5) 60   60      IV Volume (LR) 400   400    Enteral 0   0      Intake (mL) (Enteral Tube Right Nare  Nasogastric) 0   0    Shift Total 695   695   O  U  T  P  U  T   Urine 240   240      Output (mL) (Urinary Catheter Indwelling Catheter) 240   240    Stool          Number of Times Stooled 0 x   0 x    Shift Total 240   240      455          Intake/Output Summary (Last 24 hours) at 18 1059  Last data filed at 18 1000   Gross per 24 hour   Intake           920.89 ml   Output              800 ml   Net           120.89 ml         Weight: 70 kg (154 lb 5.2 oz)  Body mass index is 23.46 kg/m².    Recent Labs      18   SODIUM  141   --    POTASSIUM  3.8   --    CHLORIDE  106   --    CO2  23   --    BUN  27*   --    CREATININE  0.97   --    MAGNESIUM   --   2.0   PHOSPHORUS   --   3.8   CALCIUM  8.4*   --        GI/Nutrition:  Recent Labs      18   ALTSGPT  33   ASTSGOT  40   ALKPHOSPHAT  68   TBILIRUBIN  1.0   GLUCOSE  119*       Heme:  Recent Labs      18   RBC  5.66   --    HEMOGLOBIN  17.5   --    HEMATOCRIT  50.8   --    PLATELETCT  238   --    PROTHROMBTM   --   31.2*   APTT   --   30.7   INR   --   3.04*       Infectious Disease:  Monitored Temp 2  Av.7 °C (99.8 °F)  Min: 37.4 °C (99.3 °F)  Max: 37.9 °C (100.2 °F)  Temp  Av.7 °C (99.9 °F)  Min: 37.4 °C (99.3 °F)  Max: 37.9 °C (100.2 °F)  Recent Labs      18   WBC  14.9*   NEUTSPOLYS  87.60*   LYMPHOCYTES  7.10*   MONOCYTES  4.20   EOSINOPHILS  0.10   BASOPHILS  0.50   ASTSGOT  40   ALTSGPT  33   ALKPHOSPHAT  68   TBILIRUBIN  1.0       Meds:  • D5W   30 mL/hr at 18 0837   • amiodarone infusion  1 mg/min 1 mg/min (18 1020)   • propofol  0-80 mcg/kg/min 50 mcg/kg/min (18 1002)   • Respiratory Care per Protocol       • senna-docusate  2 Tab      And   • polyethylene glycol/lytes  1 Packet      And   • magnesium hydroxide  30 mL      And   • bisacodyl  10 mg     • MD ALERT...Adult ICU Electrolyte Replacement per Pharmacy Protocol       • LR    100 mL/hr at 08/29/18 0335   • ipratropium-albuterol  3 mL     • potassium bicarbonate  25 mEq     • Pharmacy       • famotidine  20 mg     • oxyCODONE immediate-release  5 mg      Or   • oxyCODONE immediate-release  10 mg            Assessment and Plan:  * Cardiac arrest (HCC)- (present on admission)   Assessment & Plan    - Transferred from [facility] due to presumed cardiac arrest, required CPR and 2 shocks with AED, unclear of duration of even. Per chart, pt was alert and able to answer questions post ROCS, no records currently available, they have been requested. Patient has history of congenital cardiac malformation, aortic valve replacement at age 18 and on coumadin.   - Started on amiodarone drip prior to arrival  - cardiology consulted, appreciate recommendations   PLAN  - trop elevated 2.0 ->3.27, likely secondary to shock/CPR, will CTM  - Has remained in NSR, continue amiodarone drip, titrate per cards   - echocardiogram pending   - keep Mg >2, K>4  - consider Cath pending clinical course   - will need ICD prior to discharge         Aortic stenosis- (present on admission)   Assessment & Plan    - S/p aortic valve replacement in 2003 for aortic stenosis, on coumadin  - switched to Heparin for anticoagulation, will hold till INR <2  - Cardiology following, echo pending  - consider further valve evaluation pending echo results         VONDA (acute kidney injury) (HCC)- (present on admission)   Assessment & Plan    Mild VONDA on admission, baseline renal function not known.   - elevated BUN/Cr 27/0.87 suggesting pre-renal   - possibly due to cardiac event vs dehydration from being out at [facility]   - continue IVF  - will continue to monitor         Hyperglycemia- (present on admission)   Assessment & Plan    Patient mildly hyperglycemic on admission, blood glucose 119. Likely due to stress response   - will continue to monitor   - if continues to be elevated will complete Ha1c         Leukocytosis    Assessment & Plan    Wbc 14.9 on admission, no evidence of infection, afebrile. Likely secondary to stress from cardiac event   - will continue to monitor for sings of infection and initiate abx therapy if appropriate         Ventilator dependent (HCC)- (present on admission)   Assessment & Plan    - Intubated prior to arrival for concern of airway protection   - CXR shows pulmonary edema, likely due to arrhythmia and arrest, no evidence of consolidaton   -  echocardiogram pending   - SBT today, wean vent as appropriate         Elevated troponin- (present on admission)   Assessment & Plan    - Likely secondary to ventricular arrhythmia and CPR/Shock  - EKG without ischemic changes  - Trend troponin q6h                Quality Measures:  Lines: 2 PIV     Reyes Catheter: YES   DVT Prophylaxis: Heparin   Ulcer Prophylaxis: Pepcid    Antibiotics: NA      CODE STATUS: FULL  DISPO: ICU

## 2018-08-29 NOTE — PROGRESS NOTES
PT brought up from ER with 3 RN on transport monitor. VSS,  at bedside. Bedside report done with GENOVEVA Beauchamp

## 2018-08-29 NOTE — DISCHARGE PLANNING
Medical Social Work    Referral: Family Support    Intervention: MSW received a call from ER Rochelle that pt's  and friends have arrived.  MSW met with pt's , Richardson Willis ([phone]) and pt's close family friend, Yasmeen Hilton ([phone]) and escorted them to bedside where they were updated by bedside RN.  Pt's friend, Yasmeen was with pt when he went down on scene.  Pt and  are from [place].    Plan: SW will continue to follow.

## 2018-08-29 NOTE — CARE PLAN
Problem: Fluid Volume:  Goal: Will maintain balanced intake and output  Intake/output is being closely monitored. Patient is on tube feed and has IV medications/fluids. Urine clarity and output is being observed.    Problem: Skin Integrity  Goal: Risk for impaired skin integrity will decrease  Outcome: PROGRESSING AS EXPECTED  Q2 turns, heel floats, and pillows in use for repositioning/padding. 2 RN skin check completed.

## 2018-08-29 NOTE — PROGRESS NOTES
Pt seen and examined  mAVR presumed - sounds such on my exam    Echo this am  Records requested  OOHA - shocked for presumptive VF/VT    -full dose amio for load  -INR 3 - coumadin per CC  -K better at 3.8  -no evidence of ACS/MI (trop 2 after CPR)  -EP to see prior to d/c, will need ICD for 2ndary prevention +- diagnostic cath  -may warrant fluoro +- LUZMARIA to eval valve pending TTE today

## 2018-08-29 NOTE — ED NOTES
Lab notified of add on Magnesium and Phosphorus. Lab sts needs another green top and will have a phlebotomist come down to draw. Phlebotomist at bedside to draw PT/APTT and informed need for green top.

## 2018-08-30 ENCOUNTER — APPOINTMENT (OUTPATIENT)
Dept: RADIOLOGY | Facility: MEDICAL CENTER | Age: 33
DRG: 224 | End: 2018-08-30
Attending: INTERNAL MEDICINE
Payer: COMMERCIAL

## 2018-08-30 LAB
ANION GAP SERPL CALC-SCNC: 6 MMOL/L (ref 0–11.9)
BASOPHILS # BLD AUTO: 0.4 % (ref 0–1.8)
BASOPHILS # BLD: 0.03 K/UL (ref 0–0.12)
BUN SERPL-MCNC: 19 MG/DL (ref 8–22)
CALCIUM SERPL-MCNC: 7.8 MG/DL (ref 8.5–10.5)
CHLORIDE SERPL-SCNC: 107 MMOL/L (ref 96–112)
CO2 SERPL-SCNC: 26 MMOL/L (ref 20–33)
CREAT SERPL-MCNC: 0.73 MG/DL (ref 0.5–1.4)
EOSINOPHIL # BLD AUTO: 0.06 K/UL (ref 0–0.51)
EOSINOPHIL NFR BLD: 0.8 % (ref 0–6.9)
ERYTHROCYTE [DISTWIDTH] IN BLOOD BY AUTOMATED COUNT: 41.8 FL (ref 35.9–50)
GLUCOSE SERPL-MCNC: 117 MG/DL (ref 65–99)
HCT VFR BLD AUTO: 37.1 % (ref 42–52)
HGB BLD-MCNC: 13.2 G/DL (ref 14–18)
IMM GRANULOCYTES # BLD AUTO: 0.02 K/UL (ref 0–0.11)
IMM GRANULOCYTES NFR BLD AUTO: 0.3 % (ref 0–0.9)
INR PPP: 2.49 (ref 0.87–1.13)
LYMPHOCYTES # BLD AUTO: 0.98 K/UL (ref 1–4.8)
LYMPHOCYTES NFR BLD: 13.6 % (ref 22–41)
MAGNESIUM SERPL-MCNC: 1.8 MG/DL (ref 1.5–2.5)
MCH RBC QN AUTO: 32 PG (ref 27–33)
MCHC RBC AUTO-ENTMCNC: 35.6 G/DL (ref 33.7–35.3)
MCV RBC AUTO: 90 FL (ref 81.4–97.8)
MONOCYTES # BLD AUTO: 0.5 K/UL (ref 0–0.85)
MONOCYTES NFR BLD AUTO: 6.9 % (ref 0–13.4)
NEUTROPHILS # BLD AUTO: 5.64 K/UL (ref 1.82–7.42)
NEUTROPHILS NFR BLD: 78 % (ref 44–72)
NRBC # BLD AUTO: 0 K/UL
NRBC BLD-RTO: 0 /100 WBC
PHOSPHATE SERPL-MCNC: 1.5 MG/DL (ref 2.5–4.5)
PLATELET # BLD AUTO: 157 K/UL (ref 164–446)
PMV BLD AUTO: 9.5 FL (ref 9–12.9)
POTASSIUM SERPL-SCNC: 4 MMOL/L (ref 3.6–5.5)
PROTHROMBIN TIME: 26.6 SEC (ref 12–14.6)
RBC # BLD AUTO: 4.12 M/UL (ref 4.7–6.1)
SODIUM SERPL-SCNC: 139 MMOL/L (ref 135–145)
WBC # BLD AUTO: 7.2 K/UL (ref 4.8–10.8)

## 2018-08-30 PROCEDURE — G8997 SWALLOW GOAL STATUS: HCPCS | Mod: CH

## 2018-08-30 PROCEDURE — 85610 PROTHROMBIN TIME: CPT

## 2018-08-30 PROCEDURE — 700117 HCHG RX CONTRAST REV CODE 255: Performed by: INTERNAL MEDICINE

## 2018-08-30 PROCEDURE — 700102 HCHG RX REV CODE 250 W/ 637 OVERRIDE(OP): Performed by: INTERNAL MEDICINE

## 2018-08-30 PROCEDURE — 93454 CORONARY ARTERY ANGIO S&I: CPT

## 2018-08-30 PROCEDURE — 84100 ASSAY OF PHOSPHORUS: CPT

## 2018-08-30 PROCEDURE — 80048 BASIC METABOLIC PNL TOTAL CA: CPT

## 2018-08-30 PROCEDURE — 700111 HCHG RX REV CODE 636 W/ 250 OVERRIDE (IP)

## 2018-08-30 PROCEDURE — C1887 CATHETER, GUIDING: HCPCS

## 2018-08-30 PROCEDURE — 99152 MOD SED SAME PHYS/QHP 5/>YRS: CPT

## 2018-08-30 PROCEDURE — C1769 GUIDE WIRE: HCPCS

## 2018-08-30 PROCEDURE — 700101 HCHG RX REV CODE 250: Performed by: INTERNAL MEDICINE

## 2018-08-30 PROCEDURE — 85025 COMPLETE CBC W/AUTO DIFF WBC: CPT

## 2018-08-30 PROCEDURE — 700111 HCHG RX REV CODE 636 W/ 250 OVERRIDE (IP): Performed by: INTERNAL MEDICINE

## 2018-08-30 PROCEDURE — 700105 HCHG RX REV CODE 258: Performed by: INTERNAL MEDICINE

## 2018-08-30 PROCEDURE — 92610 EVALUATE SWALLOWING FUNCTION: CPT

## 2018-08-30 PROCEDURE — C1894 INTRO/SHEATH, NON-LASER: HCPCS

## 2018-08-30 PROCEDURE — A9270 NON-COVERED ITEM OR SERVICE: HCPCS | Performed by: INTERNAL MEDICINE

## 2018-08-30 PROCEDURE — 83735 ASSAY OF MAGNESIUM: CPT

## 2018-08-30 PROCEDURE — B2111ZZ FLUOROSCOPY OF MULTIPLE CORONARY ARTERIES USING LOW OSMOLAR CONTRAST: ICD-10-PCS | Performed by: INTERNAL MEDICINE

## 2018-08-30 PROCEDURE — 700111 HCHG RX REV CODE 636 W/ 250 OVERRIDE (IP): Performed by: STUDENT IN AN ORGANIZED HEALTH CARE EDUCATION/TRAINING PROGRAM

## 2018-08-30 PROCEDURE — 99291 CRITICAL CARE FIRST HOUR: CPT | Performed by: INTERNAL MEDICINE

## 2018-08-30 PROCEDURE — 76937 US GUIDE VASCULAR ACCESS: CPT

## 2018-08-30 PROCEDURE — 71045 X-RAY EXAM CHEST 1 VIEW: CPT

## 2018-08-30 PROCEDURE — 307093 HCHG TR BAND RADIAL

## 2018-08-30 PROCEDURE — 700101 HCHG RX REV CODE 250

## 2018-08-30 PROCEDURE — 770022 HCHG ROOM/CARE - ICU (200)

## 2018-08-30 PROCEDURE — 304952 HCHG R 2 PADS

## 2018-08-30 PROCEDURE — G8996 SWALLOW CURRENT STATUS: HCPCS | Mod: CI

## 2018-08-30 RX ORDER — MAGNESIUM SULFATE HEPTAHYDRATE 40 MG/ML
2 INJECTION, SOLUTION INTRAVENOUS ONCE
Status: COMPLETED | OUTPATIENT
Start: 2018-08-30 | End: 2018-08-30

## 2018-08-30 RX ORDER — MIDAZOLAM HYDROCHLORIDE 1 MG/ML
INJECTION INTRAMUSCULAR; INTRAVENOUS
Status: COMPLETED
Start: 2018-08-30 | End: 2018-08-30

## 2018-08-30 RX ORDER — HEPARIN SODIUM 1000 [USP'U]/ML
2600 INJECTION, SOLUTION INTRAVENOUS; SUBCUTANEOUS PRN
Status: DISCONTINUED | OUTPATIENT
Start: 2018-08-30 | End: 2018-08-31

## 2018-08-30 RX ORDER — WARFARIN SODIUM 10 MG/1
10 TABLET ORAL
Status: DISCONTINUED | OUTPATIENT
Start: 2018-08-31 | End: 2018-08-31

## 2018-08-30 RX ORDER — HEPARIN SODIUM 1000 [USP'U]/ML
5000 INJECTION, SOLUTION INTRAVENOUS; SUBCUTANEOUS ONCE
Status: COMPLETED | OUTPATIENT
Start: 2018-08-30 | End: 2018-08-30

## 2018-08-30 RX ORDER — WARFARIN SODIUM 7.5 MG/1
7.5 TABLET ORAL
Status: DISCONTINUED | OUTPATIENT
Start: 2018-08-30 | End: 2018-08-31

## 2018-08-30 RX ORDER — LIDOCAINE HYDROCHLORIDE 10 MG/ML
INJECTION, SOLUTION INFILTRATION; PERINEURAL
Status: COMPLETED
Start: 2018-08-30 | End: 2018-08-30

## 2018-08-30 RX ORDER — VERAPAMIL HYDROCHLORIDE 2.5 MG/ML
INJECTION, SOLUTION INTRAVENOUS
Status: COMPLETED
Start: 2018-08-30 | End: 2018-08-30

## 2018-08-30 RX ORDER — HEPARIN SODIUM,PORCINE 1000/ML
VIAL (ML) INJECTION
Status: COMPLETED
Start: 2018-08-30 | End: 2018-08-30

## 2018-08-30 RX ORDER — WARFARIN SODIUM 7.5 MG/1
7.5 TABLET ORAL
COMMUNITY

## 2018-08-30 RX ORDER — WARFARIN SODIUM 10 MG/1
10 TABLET ORAL DAILY
COMMUNITY

## 2018-08-30 RX ORDER — SODIUM CHLORIDE 9 MG/ML
INJECTION, SOLUTION INTRAVENOUS CONTINUOUS
Status: DISCONTINUED | OUTPATIENT
Start: 2018-08-30 | End: 2018-09-01

## 2018-08-30 RX ADMIN — AMPICILLIN SODIUM AND SULBACTAM SODIUM 3 G: 2; 1 INJECTION, POWDER, FOR SOLUTION INTRAMUSCULAR; INTRAVENOUS at 05:20

## 2018-08-30 RX ADMIN — NITROGLYCERIN 10 ML: 20 INJECTION INTRAVENOUS at 13:04

## 2018-08-30 RX ADMIN — AMPICILLIN SODIUM AND SULBACTAM SODIUM 3 G: 2; 1 INJECTION, POWDER, FOR SOLUTION INTRAMUSCULAR; INTRAVENOUS at 00:19

## 2018-08-30 RX ADMIN — FENTANYL CITRATE 100 MCG: 50 INJECTION, SOLUTION INTRAMUSCULAR; INTRAVENOUS at 13:13

## 2018-08-30 RX ADMIN — MIDAZOLAM HYDROCHLORIDE 2 MG: 1 INJECTION, SOLUTION INTRAMUSCULAR; INTRAVENOUS at 13:13

## 2018-08-30 RX ADMIN — IOHEXOL 45 ML: 350 INJECTION, SOLUTION INTRAVENOUS at 13:30

## 2018-08-30 RX ADMIN — MAGNESIUM SULFATE IN WATER 2 G: 40 INJECTION, SOLUTION INTRAVENOUS at 07:52

## 2018-08-30 RX ADMIN — ACETAMINOPHEN 650 MG: 325 TABLET, FILM COATED ORAL at 06:45

## 2018-08-30 RX ADMIN — SODIUM PHOSPHATE, MONOBASIC, MONOHYDRATE AND SODIUM PHOSPHATE, DIBASIC, ANHYDROUS 15 MMOL: 276; 142 INJECTION, SOLUTION INTRAVENOUS at 17:29

## 2018-08-30 RX ADMIN — Medication 2 TABLET: at 05:54

## 2018-08-30 RX ADMIN — SODIUM CHLORIDE: 9 INJECTION, SOLUTION INTRAVENOUS at 14:42

## 2018-08-30 RX ADMIN — HEPARIN SODIUM: 1000 INJECTION, SOLUTION INTRAVENOUS; SUBCUTANEOUS at 13:04

## 2018-08-30 RX ADMIN — SODIUM PHOSPHATE, MONOBASIC, MONOHYDRATE AND SODIUM PHOSPHATE, DIBASIC, ANHYDROUS 30 MMOL: 276; 142 INJECTION, SOLUTION INTRAVENOUS at 08:45

## 2018-08-30 RX ADMIN — ACETAMINOPHEN 650 MG: 325 TABLET, FILM COATED ORAL at 17:49

## 2018-08-30 RX ADMIN — HEPARIN SODIUM 5000 UNITS: 1000 INJECTION, SOLUTION INTRAVENOUS; SUBCUTANEOUS at 17:38

## 2018-08-30 RX ADMIN — FAMOTIDINE 20 MG: 20 TABLET ORAL at 05:20

## 2018-08-30 RX ADMIN — ACETAMINOPHEN 650 MG: 325 TABLET, FILM COATED ORAL at 00:19

## 2018-08-30 RX ADMIN — HEPARIN SODIUM: 200 INJECTION, SOLUTION INTRAVENOUS at 12:45

## 2018-08-30 RX ADMIN — WARFARIN SODIUM 7.5 MG: 7.5 TABLET ORAL at 18:09

## 2018-08-30 RX ADMIN — AMIODARONE HYDROCHLORIDE 1 MG/MIN: 50 INJECTION, SOLUTION INTRAVENOUS at 00:19

## 2018-08-30 RX ADMIN — ONDANSETRON 4 MG: 2 INJECTION INTRAMUSCULAR; INTRAVENOUS at 19:57

## 2018-08-30 RX ADMIN — SODIUM CHLORIDE: 9 INJECTION, SOLUTION INTRAVENOUS at 21:35

## 2018-08-30 RX ADMIN — Medication 2 TABLET: at 17:22

## 2018-08-30 RX ADMIN — VERAPAMIL HYDROCHLORIDE 5 MG: 2.5 INJECTION, SOLUTION INTRAVENOUS at 13:04

## 2018-08-30 RX ADMIN — AMIODARONE HYDROCHLORIDE 1 MG/MIN: 50 INJECTION, SOLUTION INTRAVENOUS at 09:40

## 2018-08-30 RX ADMIN — LIDOCAINE HYDROCHLORIDE: 10 INJECTION, SOLUTION INFILTRATION; PERINEURAL at 13:04

## 2018-08-30 RX ADMIN — HEPARIN SODIUM 1050 UNITS/HR: 5000 INJECTION, SOLUTION INTRAVENOUS at 17:30

## 2018-08-30 ASSESSMENT — ENCOUNTER SYMPTOMS
SENSORY CHANGE: 0
CLAUDICATION: 0
PND: 0
WHEEZING: 0
BLURRED VISION: 0
PALPITATIONS: 0
FEVER: 0
DOUBLE VISION: 0
PSYCHIATRIC NEGATIVE: 1
SPUTUM PRODUCTION: 0
SORE THROAT: 0
MUSCULOSKELETAL NEGATIVE: 1
HEARTBURN: 0
NAUSEA: 0
HEADACHES: 1
FLANK PAIN: 0
MYALGIAS: 0
FOCAL WEAKNESS: 0
SHORTNESS OF BREATH: 1
WEIGHT LOSS: 0
DIZZINESS: 0
LOSS OF CONSCIOUSNESS: 0
ABDOMINAL PAIN: 0
SHORTNESS OF BREATH: 0
CHILLS: 0
DEPRESSION: 0
COUGH: 1
NERVOUS/ANXIOUS: 0
COUGH: 0
EYES NEGATIVE: 1
VOMITING: 0
ORTHOPNEA: 0
INSOMNIA: 0
SPEECH CHANGE: 0
HEADACHES: 0
SEIZURES: 0

## 2018-08-30 ASSESSMENT — PAIN SCALES - GENERAL
PAINLEVEL_OUTOF10: 0

## 2018-08-30 ASSESSMENT — COPD QUESTIONNAIRES
DURING THE PAST 4 WEEKS HOW MUCH DID YOU FEEL SHORT OF BREATH: NONE/LITTLE OF THE TIME
DO YOU EVER COUGH UP ANY MUCUS OR PHLEGM?: NO/ONLY WITH OCCASIONAL COLDS OR INFECTIONS
HAVE YOU SMOKED AT LEAST 100 CIGARETTES IN YOUR ENTIRE LIFE: YES
COPD SCREENING SCORE: 2

## 2018-08-30 ASSESSMENT — LIFESTYLE VARIABLES
SUBSTANCE_ABUSE: 0
EVER_SMOKED: YES

## 2018-08-30 NOTE — PROGRESS NOTES
2015: Dr. Wilkins at bedside. Central line placement discussed with family. Per Dr. Wilkins, start Levophed peripherally at 2 mcg/kg/hr, titrate to SBP >90. Pt on SBT and will attempt to extubate.     2045: Paged [facility] due to pt complaints of nausea and gagging during SBT. PRN IV Zofran order received.

## 2018-08-30 NOTE — CARE PLAN
Problem: Pain Management  Goal: Pain level will decrease to patient's comfort goal  Outcome: PROGRESSING AS EXPECTED  Denies pain.    Problem: Psychosocial Needs:  Goal: Level of anxiety will decrease  Outcome: PROGRESSING AS EXPECTED  Pt at ease w/ family

## 2018-08-30 NOTE — THERAPY
"  Speech Language Therapy Clinical Swallow Evaluation completed.  Functional Status: Fxnl swallow, post extubation, for a slightly modified diet due to s/s of penetration post thin liquids.  Expect upgrade within days.    Recommendations - Diet: Regular/NTL with pt to make soft solid choices and per posted strategies.                           Strategies: Monitor during meals, No Straws and Head of Bed at 90 Degrees                          Medication Administration:  Float in puree or whole if small, with ntl wash.  Plan of Care: SLP 3x/wk  Post-Acute Therapy: Currently anticipate no further skilled therapy needs once patient is discharged from the inpatient setting.    See \"Rehab Therapy-Acute\" Patient Summary Report for complete documentation.   "

## 2018-08-30 NOTE — PROGRESS NOTES
Cardiology Progress Note               Author: Soni Culp Date & Time created: 8/30/2018  11:05 AM     Interval History:  EP  Consult per Dr Toro:  HPI: This is a 33-year-old gentleman with history of mechanical aortic valve replacement who was at [facility] and reportedly was found unresponsive an AED was applied and he was shocked, intubated and started on amiodarone by medical staff there prior to 2300 hrs. last evening.  By care flight he received sedation and antiarrhythmics and transferred here where his EKG shows sinus rhythm and his labs show mild hypokalemia U tox only positive for marijuana.  Remainder of the history is unobtainable at this time although bystanders should be arriving to help provide clinical history there is no records in the care everywhere system from [facility] or [place].  The patient reported to be on Coumadin and we are waiting coagulation he had i-STAT labs likely done at [facility] [place] which show normal CBC and kidney function potassium 3.0  OOHA urine tox positive for marijuana and benzos.  AVR age 18 SF no CAD at time of surgery.  2 small TIA's INR 2.5-3.5  Trop 3.0 no EKG changes suggestive of ACS.    Echo post arrest as follows:  CONCLUSIONS  No prior study is available for comparison.   Normal left ventricular chamber size.  Left ventricular ejection fraction is visually estimated to be 35%.  Moderate concentric left ventricular hypertrophy, consider infiltrative   cardiomyopathy, CKD or long-standing hypertension.  Mid to apical anterior and septal wall akinesis.  Reduced right ventricular systolic function.  Mild mitral regurgitation.  Known mechanical aortic valve that is functioning normally with normal   transvalvular gradients.  Dilated inferior vena cava without inspiratory collapse.  Estimated right ventricular systolic pressure is 45 mmHg.   Chief Complaint:  OOHA    Review of Systems   Constitutional: Positive for malaise/fatigue.  Negative for chills, fever and weight loss.   HENT: Negative for congestion and sore throat.    Eyes: Negative for blurred vision and double vision.   Respiratory: Negative for cough, sputum production, shortness of breath and wheezing.    Cardiovascular: Positive for chest pain. Negative for palpitations, orthopnea, claudication, leg swelling and PND.   Gastrointestinal: Negative for abdominal pain, heartburn, nausea and vomiting.   Genitourinary: Negative for dysuria, flank pain and frequency.   Musculoskeletal: Negative.    Skin: Negative.    Neurological: Negative for dizziness, speech change, focal weakness, seizures, loss of consciousness and headaches.   Endo/Heme/Allergies: Negative.    Psychiatric/Behavioral: Negative.        Physical Exam   Constitutional: He is oriented to person, place, and time. He appears well-developed and well-nourished.   HENT:   Head: Normocephalic and atraumatic.   Eyes: Pupils are equal, round, and reactive to light.   Neck: Normal range of motion. Neck supple. No thyromegaly present.   Cardiovascular: Normal rate and regular rhythm.  Exam reveals no gallop and no friction rub.    No murmur heard.  Pulmonary/Chest: Effort normal and breath sounds normal. No respiratory distress. He has no wheezes. He has no rales.   Abdominal: Soft. Bowel sounds are normal. He exhibits no distension. There is no tenderness. There is no guarding.   Musculoskeletal: Normal range of motion. He exhibits no edema.   Neurological: He is alert and oriented to person, place, and time.   Skin: Skin is warm and dry.   Psychiatric: He has a normal mood and affect.       Hemodynamics:  Temp (24hrs), Av.1 °C (100.6 °F), Min:38.1 °C (100.6 °F), Max:38.1 °C (100.6 °F)  Temperature: (!) 38.1 °C (100.6 °F), Monitored Temp: 37.8 °C (100 °F)  Pulse  Av.8  Min: 67  Max: 96Heart Rate (Monitored): 73  NIBP: (!) 98/63     Respiratory:  Santacruz Vent Mode: Spont, Rate (breaths/min): 24, PEEP/CPAP: 8, FiO2: 30,  P Peak (PIP): 23, P MEAN: 13 Respiration: (!) 22, Pulse Oximetry: 95 %     Work Of Breathing / Effort: Mild  RUL Breath Sounds: Clear, RML Breath Sounds: Clear, RLL Breath Sounds: Clear, JERICA Breath Sounds: Clear, LLL Breath Sounds: Clear  Fluids:  Date 08/30/18 0700 - 08/31/18 0659   Shift 0096-8888 3938-9341 0086-2146 24 Hour Total   I  N  T  A  K  E   I.V. 279.3   279.3    Shift Total 279.3   279.3   O  U  T  P  U  T   Urine 925   925    Shift Total 925   925   Weight (kg) 70 70 70 70          GI/Nutrition:  Orders Placed This Encounter   Procedures   • Diet Order Regular (Pls discuss vegetarian preferences with pt, supplements per RD)     Standing Status:   Standing     Number of Occurrences:   1     Order Specific Question:   Diet:     Answer:   Regular [1]     Comments:   Pls discuss vegetarian preferences with pt, supplements per RD     Order Specific Question:   Consistency/Fluid modifications:     Answer:   Nectar Thick [2]     Order Specific Question:   Miscellaneous modifications:     Answer:   Vegetarian [13]     Lab Results:  Recent Labs      08/29/18   0128  08/30/18   0641   WBC  14.9*  7.2   RBC  5.66  4.12*   HEMOGLOBIN  17.5  13.2*   HEMATOCRIT  50.8  37.1*   MCV  89.8  90.0   MCH  30.9  32.0   MCHC  34.4  35.6*   RDW  41.0  41.8   PLATELETCT  238  157*   MPV  9.4  9.5     Recent Labs      08/29/18   0128  08/30/18   0530   SODIUM  141  139   POTASSIUM  3.8  4.0   CHLORIDE  106  107   CO2  23  26   GLUCOSE  119*  117*   BUN  27*  19   CREATININE  0.97  0.73   CALCIUM  8.4*  7.8*     Recent Labs      08/29/18   0247  08/30/18   0530   APTT  30.7   --    INR  3.04*  2.49*     Recent Labs      08/29/18   0128   BNPBTYPENAT  30     Recent Labs      08/29/18   0128  08/29/18   0935  08/29/18   1518   TROPONINI  2.05*  3.27*  1.48*   BNPBTYPENAT  30   --    --      Recent Labs      08/29/18   0128   TRIGLYCERIDE  119         Medical Decision Making, by Problem:  Active Hospital Problems    Diagnosis   •  *Cardiac arrest ([facility]) [I46.9]   • Aortic stenosis [I35.0]   • Elevated troponin [R74.8]   • Ventilator dependent (HCC) [Z99.11]   • Leukocytosis [D72.829]   • Hyperglycemia [R73.9]   • VONDA (acute kidney injury) ([facility]) [N17.9]       Plan:  OOHA etiology suspected Vt/VF.  K+ 3.0 initially. AED shock for VF.  OOHA UA positive for marijuana only.  Prior TIA's on higher dose Warfarin post AVR at age 18.Congential AS with first surgery at age 4 months.  He is a dancer. Resides in .  Echo suggesting infiltrative CM cardiac MR ordered.  Cardiology requesting EPS to determine if secondary prevention device indicated.  Records in chart in care everywhere.    Quality-Core Measures   Reviewed items::  EKG reviewed, Labs reviewed, Medications reviewed and Radiology images reviewed

## 2018-08-30 NOTE — PROGRESS NOTES
SHERLEY Garcia MD notified bedside of patient's BPs becoming more hypotensive since 16:00  (SBP 80-90 with MAPs above 60), awaiting orders.

## 2018-08-30 NOTE — PROCEDURES
REFERRING PHYSICIAN: Dr. Marta Toro and Dr. Manas Gay    PREOPERATIVE DIAGNOSIS:  1.  Out of hospital cardiac arrest  2.  History of mechanical aortic valve prosthesis  3.  Chronic anticoagulation with warfarin  4.  History of TIAs    POSTOPERATIVE DIAGNOSIS:  1.  Normal epicardial coronary arteries without coronary artery disease  2.  Normal aortic valve mechanical leaflet motion      PROCEDURE PERFORMED:  Selective coronary angiography of the native vessels  Fluoroscopy of mechanical aortic valve for functional assessment    DESCRIPTION OF PROCEDURE:  The risks and benefits of cardiac catheterization as well as the procedure itself, rationale and appropriateness were discussed with the patient today. Complications including but not limited to death, stroke, MI, urgent bypass surgery, contrast nephropathy, vascular complications, bleeding and infection were explained to the patient. The potential outcomes associated with the procedure (possible PCI, possible CABG, possible medical Rx only) were also discussed at length. The patient agreed to proceed.    The patient was transported to the catheterization laboratory in the fasting state. The right radial area and right groin were prepped and draped in the usual fashion. Right radial area was entered under direct ultrasound guidance with a single through and through puncture and a 6F glide sheath was placed. An intra-arterial cocktail of heparin, verapamil and nitroglycerine was administered. Over a wire, a 5F Sarai  catheter was passed to the aortic root using meticulous technique in order to not impinge upon the valve in any way with wire or catheter and used to engage the right and left coronaries without difficulty. Contrast was administered and multiple images obtained.  Following this the patient was positioned for fluoroscopy of the valve optimal angles were obtained and recorded.  All catheters and guidewires were removed and a TR band was applied to  achieve patent hemostasis. Patient left the cath lab in stable condition.      Moderate sedation directly monitored by me during the case while supervising the administration of the sedation medication by an independent trained RN to assist in the monitoring of the patient's level of consciousness and physiological status. I, the supervising physician was present the entire time from beginning of medication administration until the end of the procedure from 1:02 PM until 1:16 PM. For detailed administration records please see the moderate sedation documentation in the median tab.      FINDINGS:  I. HEMODYNAMICS:    Ao: 98/82 mmHg     II. CORONARY ANGIOGRAPHY:  Left Main: Large caliber somewhat tortuous in its initial portion but short and bifurcating.  Left Anterior Descending: Large caliber terminating at the apex no CAD  Left Circumflex: Moderate caliber no CAD  Right Coronary: Moderate caliber no CAD    III. AORTIC VALVE FLUOROSCOPY:  Normal annular stability without rocking.  Normal leaflet motion.    COMPLICATIONS: none apparent    CONCLUSIONS:  1.  Normal epicardial coronary arteries without coronary artery disease  2.  Normal aortic valve mechanical leaflet motion

## 2018-08-30 NOTE — PROGRESS NOTES
Patient's temperature elevated to 39.1. Started Precedex at 1500, currently at .2mcg/kg/hr. MD notified. Orders for blood cultures, PRN Tylenol, and Unasyn.

## 2018-08-30 NOTE — PROGRESS NOTES
Dr. Wilkins and RT at bedside. Precidex stopped, pt extubated at this time. Placed on 3LNC, 02 saturation 99%.

## 2018-08-30 NOTE — RESPIRATORY CARE
Extubation    Cuff leak noted YES  Stridor present NO     FiO2%: 30 % (08/29/18 2030)        Patient toleration Well  RCP Complete? Yes  Events/Summary/Plan: Parameters OK, extubated. No stridor noted. Strong cough and pt expectorates secretions into handheld yankauer. 3L 02 via NC on. (08/29/18 2454)

## 2018-08-30 NOTE — PROGRESS NOTES
[facility] ICU Progress Note      Admit Date: 8/29/2018  ICU Day: 1    Resident(s): Martita Brown  Attending: SHERLEY GIMENEZ/ Dr. Irving    Date & Time:   8/30/2018   11:03 AM       Patient ID:    Name:             Nasim Bo     YOB: 1985  Age:                 33 y.o.  male   MRN:               [id number]    HPI:    34 yo male with PMHx of aortic stenosis s/p mechanical aortic valve replacement in 2003 on coumadin and 2 previous TIA events. Patient was attending [employer] and performing when he  Suffered cardiac arrest requiring CPR and 2 AED shocks, time frame of event unclear. He was given loading dose of amiodarone and was intubated outside of hospital. Per chart review and  of the patient who is present prior to intubation patient was alert and oriented but was experiencing significant panic and was intubated due to concern for protection of airway. On admission, patient had elevated troponin of 2. He has been maintained in sinus rhythm on amiodarone drip and was started on Heparin drip for anticoagulation, Cardiology is following. Chest xray showed pulmonary edema. CT head negative of infarction, hemorrhage or mass.   Per , had congenital heart defect with multiple surgeries in the past. 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 He denies any drug use prior to event. Denies any known history of sudden cardiac death in family.     Consultants:  Cardiology: Dr. Toro   PMA: Dr. Irving     Procedures:  Intubation 8/29    Interval Events:    Extubated overnight, doing well on 1-2L NC  A&O x 4, will ambulate today as tolerated   Soft BP, continue IVF for now   Continue amio ggt  Speech evaluation, thick nectars with likely advancement tomorrow, holding now for possible procedure   Cardiology following, recommends  fluoro, EP will see pt today to evaluate for need of AICD   Holding anticoagulation for possible procedure, goal INR 2.5-3.5  Will need repeat echo prior to discharge       Imaging:  DX-CHEST-PORTABLE (1 VIEW)   Final Result      1.  Persistently enlarged cardiac silhouette   2.  Unchanged airspace disease, likely pulmonary edema      DX-ABDOMEN FOR TUBE PLACEMENT   Final Result      Feeding tube tip at the proximal descending duodenum.      ECHOCARDIOGRAM COMP W/O CONT   Final Result      DX-CHEST-PORTABLE (1 VIEW)   Final Result      1.  Alveolar pulmonary edema, less likely pneumonia   2.  Line and tube position as described above      CT-HEAD W/O   Final Result      Head CT without contrast within normal limits. No evidence of acute cerebral infarction, hemorrhage or mass lesion.      MR-CARDIAC MORPH/FUNC WITH & W/O    (Results Pending)         Review of Systems   Constitutional: Negative for chills and fever.   Eyes: Negative for blurred vision and double vision.   Respiratory: Negative for shortness of breath.    Cardiovascular: Negative for chest pain and palpitations.   Gastrointestinal: Negative for abdominal pain, nausea and vomiting.   Neurological: Positive for headaches. Negative for dizziness, sensory change, speech change and focal weakness.   Psychiatric/Behavioral: Negative for substance abuse. The patient is not nervous/anxious.        PHYSICAL EXAM  Gen: young male, well nourished, awake, alert and conversant, in NAD  HEENT: NC/AT, no scleral icterus, no conjunctival injection, mucous membranes pink and moist  Neck: Supple, no lymphadenopathy.  Cardiac: S1S2, RRR, 3/6 systolic murmur, no JVD.  Respiratory: Normal effort, symmetrical, CTA b/l.  Abdomen: BS+, soft, NT/ND, no rebound/guarding, no palpable organomegaly.  Ext: No edema, 2+ DP pulses b/l.  Skin: Warm, dry. No rashes or erythema.   Neuro:  A&O x 4, conversant, no focal deficits, CN II-XII intact.    Respiratory:  S[place]ruz Vent Mode:  Spont  Respiration: (!) 22, Pulse Oximetry: 95 %    Chest Tube Drains:    Recent Labs      08/29/18   0219  08/29/18   0522   ISTATAPH  7.339*  7.360*   ISTATAPCO2  48.6*  42.7*   ISTATAPO2  248*  156*   ISTATATCO2  28  25   [id number]  100*  99   ISTATARTHCO3  26.2*  24.1   ISTATARTBE  0  -1   ISTATTEMP  37.4 C  37.9 C   ISTATFIO2  100  60   ISTATSPEC  Arterial  Arterial   ISTATAPHTC  7.333*  7.347*   [id number]  250*  162*       HemoDynamics:  Pulse: 73, Heart Rate (Monitored): 73 NIBP: (!) 98/63      Neuro:  A&O x 4, conversant, no focal deficits, CN II-XII intact.    Fluids:    Date 08/30/18 0700 - 08/31/18 0659   Shift 9681-3425 2226-3397 6716-9659 24 Hour Total   I  N  T  A  K  E   I.V. 279.3   279.3      Magnesium Sulfate Volume 3.3   3.3      Amiodarone Volume 66   66      IV Volume (D5) 60   60      IV Volume (LR) 150   150    Shift Total 279.3   279.3   O  U  T  P  U  T   Urine 925   925      Output (mL) (Urinary Catheter Indwelling Catheter) 925   925    Stool          Number of Times Stooled 0 x   0 x    Shift Total 925   925   NET -645.7   -645.7          Intake/Output Summary (Last 24 hours) at 08/30/18 1103  Last data filed at 08/30/18 1000   Gross per 24 hour   Intake          3918.73 ml   Output             2785 ml   Net          1133.73 ml            Body mass index is 23.46 kg/m².    Recent Labs      08/29/18   0128  08/29/18   0247  08/30/18   0530   SODIUM  141   --   139   POTASSIUM  3.8   --   4.0   CHLORIDE  106   --   107   CO2  23   --   26   BUN  27*   --   19   CREATININE  0.97   --   0.73   MAGNESIUM   --   2.0  1.8   PHOSPHORUS   --   3.8  1.5*   CALCIUM  8.4*   --   7.8*       GI/Nutrition:  Recent Labs      08/29/18 0128  08/30/18   0530   ALTSGPT  33   --    ASTSGOT  40   --    ALKPHOSPHAT  68   --    TBILIRUBIN  1.0   --    GLUCOSE  119*  117*       Heme:  Recent Labs      08/29/18 0128 08/29/18   0247  08/30/18   0530  08/30/18   0641   RBC  5.66   --    --   4.12*    HEMOGLOBIN  17.5   --    --   13.2*   HEMATOCRIT  50.8   --    --   37.1*   PLATELETCT  238   --    --   157*   PROTHROMBTM   --   31.2*  26.6*   --    APTT   --   30.7   --    --    INR   --   3.04*  2.49*   --        Infectious Disease:  Monitored Temp 2  Av.1 °C (100.6 °F)  Min: 37.5 °C (99.5 °F)  Max: 39 °C (102.2 °F)  Temp  Av.1 °C (100.6 °F)  Min: 38.1 °C (100.6 °F)  Max: 38.1 °C (100.6 °F)  Recent Labs      18   0128  18   0641   WBC  14.9*  7.2   NEUTSPOLYS  87.60*  78.00*   LYMPHOCYTES  7.10*  13.60*   MONOCYTES  4.20  6.90   EOSINOPHILS  0.10  0.80   BASOPHILS  0.50  0.40   ASTSGOT  40   --    ALTSGPT  33   --    ALKPHOSPHAT  68   --    TBILIRUBIN  1.0   --        Meds:  • sodium phosphate ivpb  30 mmol 30 mmol (18 0845)    Followed by   • sodium phosphate ivpb  15 mmol     • MD Alert...Warfarin per Pharmacy       • D5W   30 mL/hr at 18 0720   • Respiratory Care per Protocol       • MD Alert...Adult ICU Electrolyte Replacement per Pharmacy       • ipratropium-albuterol  3 mL     • Pharmacy       • oxyCODONE immediate-release  5 mg      Or   • oxyCODONE immediate-release  10 mg     • senna-docusate  2 Tab      And   • polyethylene glycol/lytes  1 Packet      And   • magnesium hydroxide  30 mL      And   • bisacodyl  10 mg     • acetaminophen  650 mg     • diphenhydrAMINE  25 mg     • ondansetron  4 mg            Assessment and Plan:  * Cardiac arrest (HCC)- (present on admission)   Assessment & Plan    - Transferred from [facility] due to presumed cardiac arrest, required CPR and 2 shocks with AED, unclear of duration of even. Per chart, pt was alert and able to answer questions post ROCS, no records currently available, they have been requested. Patient has history of congenital cardiac malformation, aortic valve replacement at age 18 and on coumadin.   - Started on amiodarone drip prior to arrival  - cardiology consulted, appreciate recommendations   PLAN  -  trop elevated 2.0 ->3.27, likely secondary to shock/CPR, will CTM  - Has remained in NSR, continue amiodarone drip for secondary prevention per cards  - echocardiogram shows reduced EF 35% may be secondary to cardiac event, will repeat prior to discharge   - keep Mg >2, K>4   - fluoro today, EP consulted for evaluation of AICD placement        Aortic stenosis- (present on admission)   Assessment & Plan    - S/p aortic valve replacement in 2003 for aortic stenosis, on coumadin  - holding heparin for possible procedure, will initiate if INR <2.5  - Cardiology following  -fluoro today for evaluation of valve        VONDA (acute kidney injury) (HCC)- (present on admission)   Assessment & Plan    Mild VONDA on admission, baseline renal function not known.   - elevated BUN/Cr 27/0.87 suggesting pre-renal   - possibly due to cardiac event vs dehydration from being out at [facility]   - continue IVF  - will continue to monitor         Hyperglycemia- (present on admission)   Assessment & Plan    Patient mildly hyperglycemic on admission, blood glucose 119. Likely due to stress response   - will continue to monitor   - if continues to be elevated will complete Ha1c         Leukocytosis   Assessment & Plan    Wbc 14.9 on admission, no evidence of infection, afebrile. Likely secondary to stress from cardiac event   - resolved         Ventilator dependent (HCC)- (present on admission)   Assessment & Plan    - Intubated prior to arrival for concern of airway protection   - CXR shows pulmonary edema, likely due to arrhythmia and arrest, no evidence of consolidaton   -  echocardiogram showed dilated IVC and reduced EF 35%   - extubated 8/29, doing well on 1-2L NC,  Wean as tolerated         Elevated troponin- (present on admission)   Assessment & Plan    - Likely secondary to ventricular arrhythmia and CPR/Shock  - EKG without ischemic changes  -Trop came down, stop trending                 Quality Measures:  Lines: 2 ADRIANE Reyes  Catheter: YES, remove today   DVT Prophylaxis: Heparin   Ulcer Prophylaxis: NA  Antibiotics: NA      CODE STATUS: FULL  DISPO: ICU

## 2018-08-30 NOTE — PROGRESS NOTES
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   Eyes: Negative.    Respiratory: Negative for cough, shortness of breath and wheezing.    Cardiovascular: Negative for chest pain, palpitations, leg swelling and PND.   Gastrointestinal: Negative for heartburn and nausea.   Musculoskeletal: Negative for joint pain and myalgias.   Neurological: Negative for dizziness and loss of consciousness.   Psychiatric/Behavioral: Negative for depression. The patient is not nervous/anxious and does not have insomnia.    All other systems reviewed and are negative.      Physical Exam   Constitutional: He is oriented to person, place, and time. He appears well-developed and well-nourished.   Awake, animated - non focal   HENT:   Head: Normocephalic and atraumatic.   Eyes: Pupils are equal, round, and reactive to light. EOM are normal. No scleral icterus.   Neck: No JVD present. No thyromegaly present.   Cardiovascular: Normal rate, regular rhythm and intact distal pulses.    No murmur heard.  Loud crisp click   Pulmonary/Chest: Breath sounds normal. No respiratory distress.   Abdominal: Soft. Bowel sounds are normal. He exhibits no distension.   Musculoskeletal: He exhibits no edema or tenderness.   Lymphadenopathy:     He has no cervical adenopathy.   Neurological: He is alert and oriented to person, place, and time. No cranial nerve deficit. He exhibits normal muscle tone. Coordination normal.   Skin: Skin is warm and dry. No rash noted.   Psychiatric: He has a normal mood and affect. His behavior is normal.       Hemodynamics:  No data recorded.  Monitored Temp: 38.2 °C (100.8 °F)  Pulse  Av.1  Min: 67  Max: 96Heart Rate (Monitored):  85  NIBP: (!) 92/58     Respiratory:  [place] Vent Mode: Spont, Rate (breaths/min): 24, PEEP/CPAP: 8, FiO2: 30, P Peak (PIP): 23, P MEAN: 13 Respiration: (!) 25, Pulse Oximetry: 92 %     Work Of Breathing / Effort: Mild  RUL Breath Sounds: Clear, RML Breath Sounds: Clear, RLL Breath Sounds: Clear, JERICA Breath Sounds: Clear, LLL Breath Sounds: Clear  Fluids:     Weight: 70 kg (154 lb 5.2 oz)  GI/Nutrition:  Orders Placed This Encounter   Procedures   • Diet Order Regular (Pls discuss vegetarian preferences with pt, supplements per RD)     Standing Status:   Standing     Number of Occurrences:   1     Order Specific Question:   Diet:     Answer:   Regular [1]     Comments:   Pls discuss vegetarian preferences with pt, supplements per RD     Order Specific Question:   Consistency/Fluid modifications:     Answer:   Nectar Thick [2]     Order Specific Question:   Miscellaneous modifications:     Answer:   Vegetarian [13]     Lab Results:  Recent Labs      08/29/18   0128  08/30/18   0641   WBC  14.9*  7.2   RBC  5.66  4.12*   HEMOGLOBIN  17.5  13.2*   HEMATOCRIT  50.8  37.1*   MCV  89.8  90.0   MCH  30.9  32.0   MCHC  34.4  35.6*   RDW  41.0  41.8   PLATELETCT  238  157*   MPV  9.4  9.5     Recent Labs      08/29/18   0128  08/30/18   0530   SODIUM  141  139   POTASSIUM  3.8  4.0   CHLORIDE  106  107   CO2  23  26   GLUCOSE  119*  117*   BUN  27*  19   CREATININE  0.97  0.73   CALCIUM  8.4*  7.8*     Recent Labs      08/29/18   0247  08/30/18   0530   APTT  30.7   --    INR  3.04*  2.49*     Recent Labs      08/29/18   0128   BNPBTYPENAT  30     Recent Labs      08/29/18   0128  08/29/18   0935  08/29/18   1518   TROPONINI  2.05*  3.27*  1.48*   BNPBTYPENAT  30   --    --      Recent Labs      08/29/18   0128   TRIGLYCERIDE  119         Medical Decision Making, by Problem:  Active Hospital Problems    Diagnosis   • *Cardiac arrest (HCC) [I46.9]   • Aortic stenosis [I35.0]   • Elevated troponin [R74.8]   • Ventilator  dependent (HCC) [Z99.11]   • Leukocytosis [D72.829]   • Hyperglycemia [R73.9]   • VONDA (acute kidney injury) (HCC) [N17.9]       Plan:    34yo with mAVR at age 18 ([employer] Man) - no CAD noted at time of surgery by cath (per pt)    Has had 2 small TIAs (loss of vision) while therapeutic on coumadin  Therefore - INRs run by his team are 2.5-3.5    OOHA 8/29 at [facility] - he thinks he was dehydrated  INR was >2 at the time    OOHA   Likely VT/VF - shocked in the field  Fluoro today -- he says last TIA they did fluoro & saw a flagellate mass - suspected thrombus  TTE from yesterday reviewed -- significant LVH EF ~ 35%, mAVR without significant gradient, PHTN.  Pt says EF usually normal.  Continue amio gtt for 2ndary prevention  EP to see re -- ICD prior to d/c    NSTEMI  Doubt ACS  Plan as above  Trop 3 after cpr, no plan as yet cor coronary angio    mAVR  As above  Hep gtt when INR<2.5    CHF, likely stunning  Repeat echo inhouse -- if still low, agree with CMR to eval infiltrative or scar  Holding CHF meds at this time - no evidence of volume overload on exam, BP prohibitively low for BB or ACEI    Long d/w [facility], pt and partner & RN            Quality-Core Measures

## 2018-08-30 NOTE — CARE PLAN
Problem: Communication  Goal: The ability to communicate needs accurately and effectively will improve    Intervention: [place] patient and significant other/support system to call light to alert staff of needs  Family and pt oriented to unit, no questions at this time.       Problem: Respiratory:  Goal: Respiratory status will improve    Intervention: Educate and encourage coughing and deep breathing  Pt able to demonstrate deep breathing/cough. Suction available for secretions

## 2018-08-30 NOTE — CONSULTS
DATE OF SERVICE:  08/30/2018    ELECTROPHYSIOLOGY CONSULT    REFERRING PHYSICIANS:  Little Toro MD and Tee Corey MD    REASON FOR CONSULT:  Cardiac arrest.    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  History is obtained from   the chart, the patient, and the patient's significant other.  He was dancing,   felt okay, no chest pain, no shortness of breath, and subsequently had a   cardiac arrest, required 2 shocks from the AED for ventricular arrhythmias.    His tox screen showed a little bit of marijuana and some benzos, but nothing   else.  He denies any alcohol use, minimal smoking, no real illicit drug use.    Potassium was slightly low at 3.0.  He denies any chest pain recently.  Denies   any PND or orthopnea.  Denies any shortness of breath.  Echocardiogram shows   reduced LV function, EF of 30% with a possible infiltrated cardiomyopathy.    PAST MEDICAL HISTORY:  His past medical history includes previous history of   aortic valve surgery for congenital aortic stenosis as a child and [employer] Emeka   aortic valve replacement in [place] in either 2003 or 2004 with a [employer]. Emeka   valve.  The patient's INR was 2.5 when he came on in.    FAMILY HISTORY:  His parents are alive and well.  He has one brother, who is   alive and well.  No history of sudden cardiac death in the family.    He does take Coumadin.  Previously, he has been seen by doctors at [facility] and currently gets his care at [facility].  He has had a previous   history of a TIA in 2009, previous history of DVT.    SOCIAL HISTORY:  The patient is employed, has significant other.  Minimal   smoking and minimal alcohol, some marijuana usage, but no other drugs.    ALLERGIES:  TO PENICILLIN.    OUTPATIENT MEDICATIONS:  Include Coumadin.  Previously, he had been on   atenolol.    REVIEW OF SYSTEMS:  CONSTITUTIONAL:  Normal.  HEENT:  No sinus  problems.  LUNGS:  No pulmonary problems.  CARDIAC:  As above.  GASTROINTESTINAL:  No change in bowels.  No diarrhea or constipation.  GENITOURINARY:  Negative.  NEUROLOGIC:  Previous history of TIA.  Rest of review of systems negative by the AMA/CMS criteria.    PHYSICAL EXAMINATION:  GENERAL:  He is a thin male, in no acute distress.  VITAL SIGNS:  Blood pressure is 98/63, pulse 73, respiratory rate is 20.  HEENT:  Normocephalic, atraumatic.  Extraocular movements are intact.  NECK:  Supple.  No evidence of JVD.  LUNGS:  Show few crackles at base.  CARDIAC:  Regular rate and rhythm, normal S1, mechanical S2 with a murmur at   the right upper sternal border.  ABDOMEN:  Soft, nontender without hepatosplenomegaly.  EXTREMITIES:  Without clubbing, cyanosis, or edema.  Distal pulses are 2+.  NEUROLOGIC:  Alert and oriented x3.  Mood and affect appropriate.  PSYCHIATRIC:  Normal.  BACK:  No evidence of kyphosis.  SKIN:  Turgor is normal.    LABORATORY DATA:  White count 7.2, hemoglobin 13.2, hematocrit 37.1, platelet   count 157,000.  Chemistries:  BUN of 19, creatinine 0.73, potassium 3.8.    Other electrolytes and LFTs normal.  Tox screen positive for marijuana and   positive for benzos.  EKG shows sinus rhythm with an incomplete right   bundle-branch block, right axis deviation, possible right posterior hemiblock.    No Q-waves are seen.  EKG read out in chart everywhere showed incomplete   right bundle-branch block and right axis deviation also.  The patient had an   echo here, which showed an EF of 30%, thickened wall possible consistent with   infiltrative cardiomyopathy, valve gradient appeared to be okay, PA pressures   of 45, valve gradient across the aortic valve 16, mean of 10.  CT of the head   unremarkable.  X-ray, some enlarged heart size.  Echocardiogram from chart   everywhere from 2009 showed [employer] Emeka mechanical valve, somewhat increased   gradient at 46, EF of 60-65%.    ASSESSMENT AND PLAN:  1.   Cardiac arrest with 2 shocks from the AED with LV dysfunction, possibly   related to that post-arrest, but also possibly related to some kind of   cardiomyopathy.  The patient is getting his valve flow to make sure that it   was adequate motion.  Also, coronary angiography should be preformed just to   rule out any abnormal takeoff of the coronary arteries.  2.  Question infiltrative cardiomyopathy, get MRI.  3.  If nothing is found to be reversible, the patient will require implantable   defibrillator.  I discussed options of subcutaneous and transvenous.  I have   discussed the risk and benefits of both and downsize of both and the patient   is going to think about it.  We will have him checked to see if he is a   candidate for subcutaneous defibrillator also.       ____________________________________     MD JESSIE ARCHIBALD / NTS    DD:  08/30/2018 11:18:55  DT:  08/30/2018 11:55:29    D#:  [id number]  Job#:  [id number]

## 2018-08-30 NOTE — PROGRESS NOTES
[facility] Jose updated that pt SBP in 70's and MAP below 60. Dr. Ricketts updated Dr. Wilkins, plan to place central line and begin pressors.

## 2018-08-30 NOTE — CARE PLAN
Problem: Nutritional:  Goal: Nutrition support tolerated and meeting greater than 85% of estimated needs  Outcome: MET Date Met: 08/30/18  TF reached goal rate. However, TF now D/c'd and pt on PO diet.

## 2018-08-30 NOTE — PROGRESS NOTES
Pulmonary Critical Care Progress Note      Admit Date: 8/29/2018    Chief Complaint: OOH Cardiac Arrest    History of Present Illness: 33 y.o. male with a past medical history of aortic stenosis status post aortic valve replacement, on Coumadin, was brought to the hospital after out of hospital cardiac arrest with source CPR and AED defibrillation x 2, and was endotracheally intubated by EMS in route to the hospital impending doom.  Started on amiodarone for presumed ventricular arrhythmia for out of hospital cardiac arrest.  ICU consult was requested for evaluation and management.      Interval Events:  24 hour interval history reviewed   Tm 102.2  +2.2L over last 24hr, +1.9L since admit  Cxr with ongoing pulm edema  Wbc 14.9->7.2  K 4.0  Mg 1.8  Trop 3.2->1.4  utox w benzos/thc  inr 2.4    Amiodarone infusion  Heparin infusion  LR @ 75  Levophed @ pause    Addendum  Plan for AICD placement tomorrow      Review of Systems   Constitutional: Negative for chills and fever.   HENT: Negative for congestion.    Eyes: Negative for blurred vision and double vision.   Respiratory: Positive for cough and shortness of breath. Negative for sputum production.    Cardiovascular: Negative for chest pain and palpitations.   Gastrointestinal: Negative for abdominal pain, nausea and vomiting.   Neurological: Negative for focal weakness.   Psychiatric/Behavioral: Negative for depression.   All other systems reviewed and are negative.    Physical Exam   Constitutional: He appears well-developed and well-nourished.   HENT:   Head: Normocephalic and atraumatic.   Eyes: Pupils are equal, round, and reactive to light. Conjunctivae are normal.   Neck: No tracheal deviation present.   Cardiovascular: Normal rate and intact distal pulses.    Pulmonary/Chest: He has no wheezes. He has rales.   diminished   Abdominal: Soft. He exhibits no distension. There is no tenderness.   Musculoskeletal: He exhibits no edema.   Neurological: No cranial  nerve deficit.   Skin: Skin is warm and dry.   Psychiatric: He has a normal mood and affect.   Nursing note and vitals reviewed.      PFSH:  No change.    Respiratory:     Pulse Oximetry: 92 %  Chest Tube Drains:            Recent Labs      08/29/18   0219  08/29/18   0522   ISTATAPH  7.339*  7.360*   ISTATAPCO2  48.6*  42.7*   ISTATAPO2  248*  156*   ISTATATCO2  28  25   CXVTRIH2XQG  100*  99   ISTATARTHCO3  26.2*  24.1   ISTATARTBE  0  -1   ISTATTEMP  37.4 C  37.9 C   ISTATFIO2  100  60   ISTATSPEC  Arterial  Arterial   ISTATAPHTC  7.333*  7.347*   LBTISTZU5PO  250*  162*       HemoDynamics:  Pulse: 85, Heart Rate (Monitored): 85  NIBP: (!) 92/58         Recent Labs      08/29/18   0128  08/29/18   0935  08/29/18   1518   TROPONINI  2.05*  3.27*  1.48*   BNPBTYPENAT  30   --    --        Neuro:  GCS             Fluids:  Intake/Output       08/28/18 0700 - 08/29/18 0659 (Not Admitted) 08/29/18 0700 - 08/30/18 0659 08/30/18 0700 - 08/31/18 0659      2959-1438 4586-9127 Total 2530-7355 4618-3769 Total 9634-3563 7422-2248 Total       Intake    P.O.  --  0 0  --  0 0  --  -- --    P.O. -- 0 0 -- 0 0 -- -- --    I.V.  --  225.9 225.9  1702.7  1476.6 3179.4  --  -- --    Precedex Volume -- -- -- 12 30.2 42.1 -- -- --    Amiodarone Volume -- 150.7 150.7 396 396 792 -- -- --    Propofol Volume -- 75.2 75.2 244.8 -- 244.8 -- -- --    Norepinephrine Volume -- -- -- -- 15.5 15.5 -- -- --    IV Volume (D5) -- -- -- 300 360 660 -- -- --    IV Volume (LR) -- -- --  -- -- --    Other  --  -- --  30  -- 30  --  -- --    Medications (P.O./ Enteral Liquids) -- -- -- 30 -- 30 -- -- --    Enteral  --  -- --  215  910 1125  --  -- --    Free Water / Tube Flush -- -- -- 90 260 350 -- -- --    Intake (mL) ([REMOVED] Enteral Tube Right Nare Nasogastric) -- -- -- 0 -- 0 -- -- --    Intake (mL) (Enteral Tube Left Nare Cortrak Gastric Feeding Tube) -- -- -- 125 650 775 -- -- --    Total Intake -- 225.9 225.9 1947.7 2386.6 4334.4  -- -- --       Output    Urine  --  560 560  775  1325 2100  --  -- --    Output (mL) (Urinary Catheter Indwelling Catheter) -- 560  2100 -- -- --    Stool  --  -- --  --  -- --  --  -- --    Number of Times Stooled -- -- -- 0 x -- 0 x -- -- --    Total Output -- 560  2100 -- -- --       Net I/O     -- -334.1 -334.1 1172.7 1061.6 2234.4 -- -- --        Weight: 70 kg (154 lb 5.2 oz)  Recent Labs      18   05   SODIUM  141   --   139   POTASSIUM  3.8   --   4.0   CHLORIDE  106   --   107   CO2  23   --   26   BUN  27*   --   19   CREATININE  0.97   --   0.73   MAGNESIUM   --   2.0  1.8   PHOSPHORUS   --   3.8  1.5*   CALCIUM  8.4*   --   7.8*       GI/Nutrition:    Liver Function  Recent Labs      18   0530   ALTSGPT  33   --    ASTSGOT  40   --    ALKPHOSPHAT  68   --    TBILIRUBIN  1.0   --    GLUCOSE  119*  117*       Heme:  Recent Labs      18   0530  18   0641   RBC  5.66   --    --   4.12*   HEMOGLOBIN  17.5   --    --   13.2*   HEMATOCRIT  50.8   --    --   37.1*   PLATELETCT  238   --    --   157*   PROTHROMBTM   --   31.2*  26.6*   --    APTT   --   30.7   --    --    INR   --   3.04*  2.49*   --        Infectious Disease:  Monitored Temp 2  Av.1 °C (100.5 °F)  Min: 37.4 °C (99.3 °F)  Max: 39 °C (102.2 °F)  Micro: cultures reviewed  Recent Labs      18   0641   WBC  14.9*  7.2   NEUTSPOLYS  87.60*  78.00*   LYMPHOCYTES  7.10*  13.60*   MONOCYTES  4.20  6.90   EOSINOPHILS  0.10  0.80   BASOPHILS  0.50  0.40   ASTSGOT  40   --    ALTSGPT  33   --    ALKPHOSPHAT  68   --    TBILIRUBIN  1.0   --      Current Facility-Administered Medications   Medication Dose Frequency Provider Last Rate Last Dose   • magnesium sulfate IVPB premix 2 g  2 g Once Landon Irving M.D.       • sodium phosphate 30 mmol in D5W 500 mL ivpb  30 mmol Once Landon Irving M.D.         Followed by   • sodium phosphate 15 mmol in D5W 250 mL ivpb  15 mmol Once Landon Irving M.D.       • D5W infusion   Continuous Rajan Cao M.D. 30 mL/hr at 08/30/18 0720     • amiodarone (CORDARONE) 450 mg in D5W 250 mL Infusion  1 mg/min Continuous Landon Irving M.D. 33 mL/hr at 08/30/18 0720 1 mg/min at 08/30/18 0720   • Respiratory Care per Protocol   Continuous RT Lalo Wilkins M.D.       • MD ALERT...Adult ICU Electrolyte Replacement per Pharmacy Protocol   pharmacy to dose Lalo Wilkins M.D.       • ipratropium-albuterol (DUONEB) nebulizer solution  3 mL Q4H PRN (RT) Lalo Wilkins M.D.       • Pharmacy Consult: Enteral tube feeding - review meds/change route/product selection   PRN Martita Brown M.D.       • famotidine (PEPCID) tablet 20 mg  20 mg BID Landon Irving M.D.   20 mg at 08/30/18 0520   • oxyCODONE immediate-release (ROXICODONE) tablet 5 mg  5 mg Q4HRS PRN Martita Brown M.D.   5 mg at 08/29/18 1645    Or   • oxyCODONE immediate release (ROXICODONE) tablet 10 mg  10 mg Q4HRS PRN Martita Brown M.D.       • senna-docusate (PERICOLACE or SENOKOT S) 8.6-50 MG per tablet 2 Tab  2 Tab BID Landon Irving M.D.   2 Tab at 08/30/18 0554    And   • polyethylene glycol/lytes (MIRALAX) PACKET 1 Packet  1 Packet QDAY PRN Landon Irving M.D.        And   • magnesium hydroxide (MILK OF MAGNESIA) suspension 30 mL  30 mL QDAY PRN Landon Irving M.D.        And   • bisacodyl (DULCOLAX) suppository 10 mg  10 mg QDAY PRN Landon Irving M.D.       • ampicillin/sulbactam (UNASYN) 3 g in  mL IVPB  3 g Q6HRS Lalo Wilkins M.D.   Stopped at 08/30/18 0550   • acetaminophen (TYLENOL) tablet 650 mg  650 mg Q4HRS PRN Lalo Wilkins M.D.   650 mg at 08/30/18 0645   • diphenhydrAMINE (BENADRYL) injection 25 mg  25 mg Q6HRS PRN Neal Melchor, PharmD       • norepinephrine (LEVOPHED) 8 mg in  mL Infusion  0-30 mcg/min Continuous Nasim Ricketts M.D. 0 mL/hr at  08/30/18 0721 0 mcg/min at 08/30/18 0721   • lactated ringers infusion   Continuous Lalo Wilkins M.D. 75 mL/hr at 08/30/18 0720     • fentaNYL (SUBLIMAZE) injection 25 mcg  25 mcg Q2HRS PRN Nasim Ricketts M.D.       • ondansetron (ZOFRAN) syringe/vial injection 4 mg  4 mg Q4HRS PRN Nasim Ricketts M.D.   4 mg at 08/29/18 2057   • ipratropium-albuterol (DUONEB) nebulizer solution  3 mL Q6HRS WHILE AWAKE (RT) Lalo Wilkins M.D.         This patient's medications have not been reviewed.    Quality  Measures:  Labs reviewed, Medications reviewed and Radiology images reviewed                      Assessment/Plan:  -  Acute Hypoxic Respiratory Failure   - intubated 8/29-29   - rt/02 protocols   - start diurese w lasix   - is/pep    -  Out of hospital cardiac arrest - VF/VT   - no hypothermia due to following/purposeful   - s/p cpr + defib x 2 w ROSC   - continue amiodarone infusion   - echo EF 35%, concentric lvh, rvsp 45   - EP eval for ICD +/- Diagnostic cath     -  Hx of AS with mAVR   - therapeutic on coumadin   - currently on heparin    -  Leukocytosis   - resolved    -  Hypokalemia   - I am replacing to keep > 4    -  Hypomagnesemia    - I am replacing to keep > 2    Pt remains critically ill, currently on amiodarone infusion due to VFib arrest, being evaluated for possible ICD placement     Discussed patient condition and risk of morbidity and/or mortality with RN, RT, Therapies, Pharmacy and cardiology.    The patient remains critically ill.  Critical care time = 32 minutes in directly providing and coordinating critical care and extensive data review.  No time overlap and excludes procedures.

## 2018-08-31 ENCOUNTER — APPOINTMENT (OUTPATIENT)
Dept: RADIOLOGY | Facility: MEDICAL CENTER | Age: 33
DRG: 224 | End: 2018-08-31
Attending: INTERNAL MEDICINE
Payer: COMMERCIAL

## 2018-08-31 LAB
ANION GAP SERPL CALC-SCNC: 5 MMOL/L (ref 0–11.9)
APTT PPP: 110.9 SEC (ref 24.7–36)
APTT PPP: 80.5 SEC (ref 24.7–36)
BASOPHILS # BLD AUTO: 0.8 % (ref 0–1.8)
BASOPHILS # BLD: 0.05 K/UL (ref 0–0.12)
BUN SERPL-MCNC: 11 MG/DL (ref 8–22)
CALCIUM SERPL-MCNC: 7.9 MG/DL (ref 8.5–10.5)
CHLORIDE SERPL-SCNC: 110 MMOL/L (ref 96–112)
CO2 SERPL-SCNC: 25 MMOL/L (ref 20–33)
CREAT SERPL-MCNC: 0.64 MG/DL (ref 0.5–1.4)
EOSINOPHIL # BLD AUTO: 0.06 K/UL (ref 0–0.51)
EOSINOPHIL NFR BLD: 0.9 % (ref 0–6.9)
ERYTHROCYTE [DISTWIDTH] IN BLOOD BY AUTOMATED COUNT: 43.3 FL (ref 35.9–50)
GLUCOSE SERPL-MCNC: 95 MG/DL (ref 65–99)
HCT VFR BLD AUTO: 35.4 % (ref 42–52)
HGB BLD-MCNC: 12.1 G/DL (ref 14–18)
IMM GRANULOCYTES # BLD AUTO: 0.03 K/UL (ref 0–0.11)
IMM GRANULOCYTES NFR BLD AUTO: 0.5 % (ref 0–0.9)
INR PPP: 1.76 (ref 0.87–1.13)
LYMPHOCYTES # BLD AUTO: 0.9 K/UL (ref 1–4.8)
LYMPHOCYTES NFR BLD: 14 % (ref 22–41)
MAGNESIUM SERPL-MCNC: 1.6 MG/DL (ref 1.5–2.5)
MCH RBC QN AUTO: 31.4 PG (ref 27–33)
MCHC RBC AUTO-ENTMCNC: 34.2 G/DL (ref 33.7–35.3)
MCV RBC AUTO: 91.9 FL (ref 81.4–97.8)
MONOCYTES # BLD AUTO: 0.39 K/UL (ref 0–0.85)
MONOCYTES NFR BLD AUTO: 6.1 % (ref 0–13.4)
NEUTROPHILS # BLD AUTO: 4.99 K/UL (ref 1.82–7.42)
NEUTROPHILS NFR BLD: 77.7 % (ref 44–72)
NRBC # BLD AUTO: 0 K/UL
NRBC BLD-RTO: 0 /100 WBC
PHOSPHATE SERPL-MCNC: 1.6 MG/DL (ref 2.5–4.5)
PLATELET # BLD AUTO: 155 K/UL (ref 164–446)
PMV BLD AUTO: 10 FL (ref 9–12.9)
POTASSIUM SERPL-SCNC: 3.6 MMOL/L (ref 3.6–5.5)
PROTHROMBIN TIME: 20.2 SEC (ref 12–14.6)
RBC # BLD AUTO: 3.85 M/UL (ref 4.7–6.1)
SODIUM SERPL-SCNC: 140 MMOL/L (ref 135–145)
WBC # BLD AUTO: 6.4 K/UL (ref 4.8–10.8)

## 2018-08-31 PROCEDURE — 700105 HCHG RX REV CODE 258: Performed by: INTERNAL MEDICINE

## 2018-08-31 PROCEDURE — 700111 HCHG RX REV CODE 636 W/ 250 OVERRIDE (IP): Performed by: INTERNAL MEDICINE

## 2018-08-31 PROCEDURE — 305387 HCHG SUTURES

## 2018-08-31 PROCEDURE — 700101 HCHG RX REV CODE 250: Mod: JW

## 2018-08-31 PROCEDURE — A9270 NON-COVERED ITEM OR SERVICE: HCPCS | Performed by: INTERNAL MEDICINE

## 2018-08-31 PROCEDURE — 85610 PROTHROMBIN TIME: CPT

## 2018-08-31 PROCEDURE — 700117 HCHG RX CONTRAST REV CODE 255: Performed by: INTERNAL MEDICINE

## 2018-08-31 PROCEDURE — 33270 INS/REP SUBQ DEFIBRILLATOR: CPT

## 2018-08-31 PROCEDURE — 700111 HCHG RX REV CODE 636 W/ 250 OVERRIDE (IP)

## 2018-08-31 PROCEDURE — 75561 CARDIAC MRI FOR MORPH W/DYE: CPT

## 2018-08-31 PROCEDURE — 93641 EP EVL 1/2CHMB PAC CVDFB TST: CPT

## 2018-08-31 PROCEDURE — C1899 LEAD, PMKR/AICD COMBINATION: HCPCS

## 2018-08-31 PROCEDURE — 00534 ANES INSERTION/RPLCMT CVDFB: CPT

## 2018-08-31 PROCEDURE — 02HK3KZ INSERTION OF DEFIBRILLATOR LEAD INTO RIGHT VENTRICLE, PERCUTANEOUS APPROACH: ICD-10-PCS | Performed by: INTERNAL MEDICINE

## 2018-08-31 PROCEDURE — 770022 HCHG ROOM/CARE - ICU (200)

## 2018-08-31 PROCEDURE — 0JH608Z INSERTION OF DEFIBRILLATOR GENERATOR INTO CHEST SUBCUTANEOUS TISSUE AND FASCIA, OPEN APPROACH: ICD-10-PCS | Performed by: INTERNAL MEDICINE

## 2018-08-31 PROCEDURE — 700105 HCHG RX REV CODE 258: Performed by: STUDENT IN AN ORGANIZED HEALTH CARE EDUCATION/TRAINING PROGRAM

## 2018-08-31 PROCEDURE — 80048 BASIC METABOLIC PNL TOTAL CA: CPT

## 2018-08-31 PROCEDURE — 304952 HCHG R 2 PADS

## 2018-08-31 PROCEDURE — 700102 HCHG RX REV CODE 250 W/ 637 OVERRIDE(OP): Performed by: INTERNAL MEDICINE

## 2018-08-31 PROCEDURE — C1722 AICD, SINGLE CHAMBER: HCPCS

## 2018-08-31 PROCEDURE — 700101 HCHG RX REV CODE 250: Performed by: STUDENT IN AN ORGANIZED HEALTH CARE EDUCATION/TRAINING PROGRAM

## 2018-08-31 PROCEDURE — 99233 SBSQ HOSP IP/OBS HIGH 50: CPT | Performed by: INTERNAL MEDICINE

## 2018-08-31 PROCEDURE — 700101 HCHG RX REV CODE 250

## 2018-08-31 PROCEDURE — 85730 THROMBOPLASTIN TIME PARTIAL: CPT | Mod: 91

## 2018-08-31 PROCEDURE — A9585 GADOBUTROL INJECTION: HCPCS | Performed by: INTERNAL MEDICINE

## 2018-08-31 PROCEDURE — 85025 COMPLETE CBC W/AUTO DIFF WBC: CPT

## 2018-08-31 PROCEDURE — 84100 ASSAY OF PHOSPHORUS: CPT

## 2018-08-31 PROCEDURE — 83735 ASSAY OF MAGNESIUM: CPT

## 2018-08-31 RX ORDER — LIDOCAINE HYDROCHLORIDE 10 MG/ML
INJECTION, SOLUTION INFILTRATION; PERINEURAL
Status: COMPLETED
Start: 2018-08-31 | End: 2018-08-31

## 2018-08-31 RX ORDER — ASPIRIN 81 MG/1
81 TABLET, CHEWABLE ORAL DAILY
Status: DISCONTINUED | OUTPATIENT
Start: 2018-08-31 | End: 2018-09-02 | Stop reason: HOSPADM

## 2018-08-31 RX ORDER — OXYCODONE HYDROCHLORIDE AND ACETAMINOPHEN 5; 325 MG/1; MG/1
1-2 TABLET ORAL EVERY 4 HOURS PRN
Status: DISCONTINUED | OUTPATIENT
Start: 2018-08-31 | End: 2018-09-02 | Stop reason: HOSPADM

## 2018-08-31 RX ORDER — GADOBUTROL 604.72 MG/ML
15 INJECTION INTRAVENOUS ONCE
Status: COMPLETED | OUTPATIENT
Start: 2018-08-31 | End: 2018-08-31

## 2018-08-31 RX ORDER — WARFARIN SODIUM 10 MG/1
10 TABLET ORAL
Status: COMPLETED | OUTPATIENT
Start: 2018-08-31 | End: 2018-08-31

## 2018-08-31 RX ORDER — MAGNESIUM SULFATE HEPTAHYDRATE 40 MG/ML
2 INJECTION, SOLUTION INTRAVENOUS ONCE
Status: COMPLETED | OUTPATIENT
Start: 2018-08-31 | End: 2018-08-31

## 2018-08-31 RX ORDER — MIDAZOLAM HYDROCHLORIDE 1 MG/ML
INJECTION INTRAMUSCULAR; INTRAVENOUS
Status: DISPENSED
Start: 2018-08-31 | End: 2018-09-01

## 2018-08-31 RX ADMIN — WARFARIN SODIUM 10 MG: 10 TABLET ORAL at 17:05

## 2018-08-31 RX ADMIN — Medication 2 TABLET: at 05:35

## 2018-08-31 RX ADMIN — Medication 2 TABLET: at 17:05

## 2018-08-31 RX ADMIN — SODIUM CHLORIDE: 9 INJECTION, SOLUTION INTRAVENOUS at 23:55

## 2018-08-31 RX ADMIN — GADOBUTROL 15 ML: 604.72 INJECTION INTRAVENOUS at 12:50

## 2018-08-31 RX ADMIN — OXYCODONE HYDROCHLORIDE AND ACETAMINOPHEN 1 TABLET: 5; 325 TABLET ORAL at 18:12

## 2018-08-31 RX ADMIN — ASPIRIN 81 MG: 81 TABLET, CHEWABLE ORAL at 17:04

## 2018-08-31 RX ADMIN — SODIUM CHLORIDE: 9 INJECTION, SOLUTION INTRAVENOUS at 05:36

## 2018-08-31 RX ADMIN — POTASSIUM PHOSPHATE, MONOBASIC AND POTASSIUM PHOSPHATE, DIBASIC 30 MMOL: 224; 236 INJECTION, SOLUTION INTRAVENOUS at 07:57

## 2018-08-31 RX ADMIN — VANCOMYCIN HYDROCHLORIDE 1000 MG: 1 INJECTION, POWDER, LYOPHILIZED, FOR SOLUTION INTRAVENOUS at 15:10

## 2018-08-31 RX ADMIN — LIDOCAINE HYDROCHLORIDE: 10 INJECTION, SOLUTION INFILTRATION; PERINEURAL at 15:11

## 2018-08-31 RX ADMIN — OXYCODONE HYDROCHLORIDE AND ACETAMINOPHEN 1 TABLET: 5; 325 TABLET ORAL at 23:56

## 2018-08-31 RX ADMIN — ACETAMINOPHEN 650 MG: 325 TABLET, FILM COATED ORAL at 03:15

## 2018-08-31 RX ADMIN — ACETAMINOPHEN 650 MG: 325 TABLET, FILM COATED ORAL at 09:38

## 2018-08-31 RX ADMIN — MAGNESIUM SULFATE IN WATER 2 G: 40 INJECTION, SOLUTION INTRAVENOUS at 07:57

## 2018-08-31 ASSESSMENT — ENCOUNTER SYMPTOMS
FLANK PAIN: 0
NAUSEA: 0
CLAUDICATION: 0
SPUTUM PRODUCTION: 0
DOUBLE VISION: 0
WHEEZING: 0
WEIGHT LOSS: 0
ORTHOPNEA: 0
PSYCHIATRIC NEGATIVE: 1
VOMITING: 0
NERVOUS/ANXIOUS: 0
SEIZURES: 0
LOSS OF CONSCIOUSNESS: 0
SENSORY CHANGE: 0
SPEECH CHANGE: 0
PALPITATIONS: 0
COUGH: 0
ABDOMINAL PAIN: 0
HEADACHES: 0
SORE THROAT: 0
MUSCULOSKELETAL NEGATIVE: 1
FOCAL WEAKNESS: 0
FEVER: 0
DEPRESSION: 0
DIZZINESS: 0
NAUSEA: 1
CHILLS: 0
SHORTNESS OF BREATH: 0
HEARTBURN: 0
PND: 0
BLURRED VISION: 0

## 2018-08-31 ASSESSMENT — PAIN SCALES - GENERAL
PAINLEVEL_OUTOF10: 0
PAINLEVEL_OUTOF10: 5
PAINLEVEL_OUTOF10: 0
PAINLEVEL_OUTOF10: 2
PAINLEVEL_OUTOF10: 3
PAINLEVEL_OUTOF10: 5
PAINLEVEL_OUTOF10: 2
PAINLEVEL_OUTOF10: 0
PAINLEVEL_OUTOF10: 3
PAINLEVEL_OUTOF10: 0
PAINLEVEL_OUTOF10: 0
PAINLEVEL_OUTOF10: 3

## 2018-08-31 ASSESSMENT — LIFESTYLE VARIABLES: SUBSTANCE_ABUSE: 0

## 2018-08-31 NOTE — PROGRESS NOTES
Pulmonary Critical Care Progress Note      Admit Date: 8/29/2018    Chief Complaint: OOH Cardiac Arrest    History of Present Illness: 33 y.o. male with a past medical history of aortic stenosis status post aortic valve replacement, on Coumadin, was brought to the hospital after out of hospital cardiac arrest with source CPR and AED defibrillation x 2, and was endotracheally intubated by EMS in route to the hospital impending doom.  Started on amiodarone for presumed ventricular arrhythmia for out of hospital cardiac arrest.  ICU consult was requested for evaluation and management.      Interval Events:  24 hour interval history reviewed   Tm 100.6  -1.4L over last 24hr, +442cc since admit  No cxr this am  Wbc 14.9->7.2->6.4  K 3.6  Mg 1.6  inr 1.76    bcx 8/29 ngtd    Heparin infusion  NS @ 125      S/P Cardiac Cath 8/30 without cad  Plan for ICD placement today      Review of Systems   Constitutional: Negative for chills and fever.   HENT: Negative for congestion.    Eyes: Negative for blurred vision and double vision.   Respiratory: Negative for cough, sputum production and shortness of breath.    Cardiovascular: Negative for chest pain and palpitations.   Gastrointestinal: Positive for nausea. Negative for abdominal pain and vomiting.   Neurological: Negative for focal weakness.   Psychiatric/Behavioral: Negative for depression.   All other systems reviewed and are negative.    Physical Exam   Constitutional: He appears well-developed and well-nourished.   HENT:   Head: Normocephalic and atraumatic.   Eyes: Pupils are equal, round, and reactive to light. Conjunctivae are normal.   Neck: No tracheal deviation present.   Cardiovascular: Normal rate and intact distal pulses.    Pulmonary/Chest: He has no wheezes. He has no rales.   diminished   Abdominal: Soft. He exhibits no distension. There is no tenderness.   Musculoskeletal: He exhibits no edema.   Neurological: No cranial nerve deficit.   Skin: Skin is warm  and dry.   Psychiatric: He has a normal mood and affect.   Nursing note and vitals reviewed.      PFSH:  No change.    Respiratory:     Pulse Oximetry: 91 %  Chest Tube Drains:            Recent Labs      08/29/18   0219  08/29/18   0522   ISTATAPH  7.339*  7.360*   ISTATAPCO2  48.6*  42.7*   ISTATAPO2  248*  156*   ISTATATCO2  28  25   [id number]  100*  99   ISTATARTHCO3  26.2*  24.1   ISTATARTBE  0  -1   ISTATTEMP  37.4 C  37.9 C   ISTATFIO2  100  60   ISTATSPEC  Arterial  Arterial   ISTATAPHTC  7.333*  7.347*   [id number]  250*  162*       HemoDynamics:  Pulse: 66, Heart Rate (Monitored): 67  NIBP: 106/62         Recent Labs      08/29/18   0128  08/29/18   0935  08/29/18   1518   TROPONINI  2.05*  3.27*  1.48*   BNPBTYPENAT  30   --    --        Neuro:  GCS             Fluids:  Intake/Output       08/29/18 0700 - 08/30/18 0659 08/30/18 0700 - 08/31/18 0659 08/31/18 0700 - 09/01/18 0659      2403-0604 0009-7934 Total 8557-9702 5932-9096 Total 0366-3661 3487-9663 Total       Intake    P.O.  --  0 0  60  240 300  --  -- --    P.O. -- 0 0 60 240 300 -- -- --    I.V.  1702.7  1476.6 3179.4  574.6  668 1242.6  --  -- --    Magnesium Sulfate Volume -- -- -- 3.3 -- 3.3 -- -- --    Precedex Volume 12 30.2 42.1 -- -- -- -- -- --    Amiodarone Volume 396 396 792 151.3 -- 151.3 -- -- --    Propofol Volume 244.8 -- 244.8 -- -- -- -- -- --    Norepinephrine Volume -- 15.5 15.5 -- -- -- -- -- --    Heparin Volume -- -- -- -- 168 168 -- -- --    IV Volume (D5) 300 360 660 120 -- 120 -- -- --    IV Volume (LR)  300 -- 300 -- -- --    IV Volume (NS) -- -- -- -- 500 500 -- -- --    Other  30  -- 30  --  -- --  --  -- --    Medications (P.O./ Enteral Liquids) 30 -- 30 -- -- -- -- -- --    Enteral  215  910 1125  --  -- --  --  -- --    Free Water / Tube Flush 90 260 350 -- -- -- -- -- --    Intake (mL) ([REMOVED] Enteral Tube Right Nare Nasogastric) 0 -- 0 -- -- -- -- -- --    Intake (mL) ([REMOVED] Enteral Tube Left  Adeel Cole Gastric Feeding Tube) 125 650 775 -- -- -- -- -- --    Total Intake 1947.7 2386.6 4334.4 634.6 908 1542.6 -- -- --       Output    Urine  775  1325 2100  2500  500 3000  --  -- --    Output (mL) (Urinary Catheter Indwelling Catheter) 775 1325 2100 2500 500 3000 -- -- --    Stool  --  -- --  --  -- --  --  -- --    Number of Times Stooled 0 x -- 0 x 0 x 0 x 0 x -- -- --    Total Output 775 1325 2100 2500 500 3000 -- -- --       Net I/O     1172.7 1061.6 2234.4 -1865.4 408 -1457.4 -- -- --           Recent Labs      18   0530   SODIUM  141   --   139   POTASSIUM  3.8   --   4.0   CHLORIDE  106   --   107   CO2  23   --   26   BUN  27*   --   19   CREATININE  0.97   --   0.73   MAGNESIUM   --   2.0  1.8   PHOSPHORUS   --   3.8  1.5*   CALCIUM  8.4*   --   7.8*       GI/Nutrition:    Liver Function  Recent Labs      18   0530   ALTSGPT  33   --    ASTSGOT  40   --    ALKPHOSPHAT  68   --    TBILIRUBIN  1.0   --    GLUCOSE  119*  117*       Heme:  Recent Labs      18   0530  18   0641  18   0038  18   0542   RBC  5.66   --    --   4.12*   --   3.85*   HEMOGLOBIN  17.5   --    --   13.2*   --   12.1*   HEMATOCRIT  50.8   --    --   37.1*   --   35.4*   PLATELETCT  238   --    --   157*   --   155*   PROTHROMBTM   --   31.2*  26.6*   --    --    --    APTT   --   30.7   --    --   80.5*   --    INR   --   3.04*  2.49*   --    --    --        Infectious Disease:  Monitored Temp 2  Av.9 °C (100.2 °F)  Min: 37.6 °C (99.7 °F)  Max: 38.3 °C (100.9 °F)  Temp  Av.4 °C (79.5 °F)  Min: 3.1 °C (37.6 °F)  Max: 38.1 °C (100.6 °F)  Micro: cultures reviewed  Recent Labs      18   0128  18   0641  18   0542   WBC  14.9*  7.2  6.4   NEUTSPOLYS  87.60*  78.00*  77.70*   LYMPHOCYTES  7.10*  13.60*  14.00*   MONOCYTES  4.20  6.90  6.10   EOSINOPHILS  0.10  0.80  0.90   BASOPHILS   0.50  0.40  0.80   ASTSGOT  40   --    --    ALTSGPT  33   --    --    ALKPHOSPHAT  68   --    --    TBILIRUBIN  1.0   --    --      Current Facility-Administered Medications   Medication Dose Frequency Provider Last Rate Last Dose   • MD Alert...Warfarin per Pharmacy   pharmacy to dose EZEKIEL Escobar       • NS infusion   Continuous Alex Atkins M.D. 125 mL/hr at 08/31/18 0536     • heparin injection 2,600 Units  2,600 Units PRN Landon Irving M.D.   Stopped at 08/30/18 2325    And   • heparin infusion 25,000 units in 500 ml 0.45% nacl   Continuous Landon Irving M.D. 21 mL/hr at 08/31/18 0129 1,050 Units/hr at 08/31/18 0129   • warfarin (COUMADIN) tablet 7.5 mg  7.5 mg Once per day on Sun Mon Tue Thu Sat Landon Irving M.D.   7.5 mg at 08/30/18 1809    And   • warfarin (COUMADIN) tablet 10 mg  10 mg Once per day on Wed Fri Landon Irving M.D.       • D5W infusion   Continuous Rajan Cao M.D. 0 mL/hr at 08/30/18 1350     • Respiratory Care per Protocol   Continuous RT Lalo Wilkins M.D.       • MD ALERT...Adult ICU Electrolyte Replacement per Pharmacy Protocol   pharmacy to dose Lalo Wilkins M.D.       • ipratropium-albuterol (DUONEB) nebulizer solution  3 mL Q4H PRN (RT) Lalo Wilkins M.D.       • Pharmacy Consult: Enteral tube feeding - review meds/change route/product selection   PRN Martita Brown M.D.       • oxyCODONE immediate-release (ROXICODONE) tablet 5 mg  5 mg Q4HRS PRN Martita Brown M.D.   5 mg at 08/29/18 1645    Or   • oxyCODONE immediate release (ROXICODONE) tablet 10 mg  10 mg Q4HRS PRN Martita Brown M.D.       • senna-docusate (PERICOLACE or SENOKOT S) 8.6-50 MG per tablet 2 Tab  2 Tab BID Landon Irving M.D.   2 Tab at 08/31/18 0535    And   • polyethylene glycol/lytes (MIRALAX) PACKET 1 Packet  1 Packet QDAY PRSARA Irving M.D.        And   • magnesium hydroxide (MILK OF MAGNESIA) suspension 30 mL  30 mL QDAY FAZAL Irving M.D.         And   • bisacodyl (DULCOLAX) suppository 10 mg  10 mg QDAY PRN Landon Irving M.D.       • acetaminophen (TYLENOL) tablet 650 mg  650 mg Q4HRS PRN Lalo Wilkins M.D.   650 mg at 08/31/18 0315   • diphenhydrAMINE (BENADRYL) injection 25 mg  25 mg Q6HRS PRN Neal Melchor, PharmD       • ondansetron (ZOFRAN) syringe/vial injection 4 mg  4 mg Q4HRS PRN Nasim Ricketts M.D.   4 mg at 08/30/18 1957     This patient's medications have not been reviewed.    Quality  Measures:  Labs reviewed, Medications reviewed and Radiology images reviewed                      Assessment/Plan:  -  Acute Hypoxic Respiratory Failure   - intubated 8/29-29   - rt/02 protocols   - is/pep   - post procedures will resume diureses     -  Out of hospital cardiac arrest - VF/VT   - no hypothermia due to following/purposeful   - s/p cpr + defib x 2 w ROSC   - continue amiodarone infusion   - echo EF 35%, concentric lvh, rvsp 45   - cath (-) for cad    - ICD to be placed today   - p cardiac mri as well to eval any infiltrative dz     -  Hx of AS with mAVR   - resume coumadin   - currently on heparin    -  Leukocytosis   - resolved    -  Hypokalemia   - I am replacing to keep > 4    -  Hypomagnesemia    - I am replacing to keep > 2    Discussed patient condition and risk of morbidity and/or mortality with RN, RT, Therapies, Pharmacy and cardiology.

## 2018-08-31 NOTE — PROGRESS NOTES
Monitor Summary: .16/.10/.46. Rhythm: SR. Rate: 60's-80's.     12 hour chart check.    2 RN skin check.

## 2018-08-31 NOTE — PROGRESS NOTES
Inpatient Anticoagulation Service Note    Date: 8/30/2018  Reason for Anticoagulation: Aortic Mechanical Valve Replacement        Hemoglobin Value: (!) 13.2 (Results confirmed by redraw. Results confirmed by repeat testing.)  Hematocrit Value: (!) 37.1  Lab Platelet Value: (!) 157  Target INR: 2.5 to 3.5 (Higher range due to TIA on therapeutic warfarin in the past)    INR from last 7 days     Date/Time INR Value    08/30/18 0530 (!)  2.49    08/29/18 0247 (!)  3.04        Dose from last 7 days     Date/Time Dose (mg)    08/30/18 1700  7.5        Significant Interactions: Amiodarone  Bridge Therapy: Yes, currently on heparin infusion  Bridge Therapy Start Date: 08/30/18  Days of Overlap Therapy: 1     Comments and Plan: Spoke with patient to clarify home regimen - 10 mg on W/F and 7.5 mg daily the rest of the week.  Per discussion with EP, patient to resume warfarin today with possible ICD tomorrow.  Will resume home dose at this time.  Amiodarone DDI noted, therapy discontinued today but may have prolonged effects.  Will follow daily while inpatient.      Pharmacist suggested discharge dosing:  Likely safe to discharge on home dose (admission INR of 3).  Will assess for new DDI while inpatient.       Thank you!  Maddy Luther, MisaD

## 2018-08-31 NOTE — PROGRESS NOTES
[facility] ICU Progress Note      Admit Date: 8/29/2018  ICU Day: 1    Resident(s): Dr.seshagiri Che  Attending: SHERLEY GIMENEZ/ Dr. Irving    Date & Time:   8/31/2018   10:38 AM       Patient ID:    Name:             Nasim Bo     YOB: 1985  Age:                 33 y.o.  male   MRN:               [id number]    HPI:    32 yo male with PMHx of aortic stenosis s/p mechanical aortic valve replacement in 2003 on coumadin and 2 previous TIA events. Patient was attending [employer] and performing when he  Suffered cardiac arrest requiring CPR and 2 AED shocks, time frame of event unclear. He was given loading dose of amiodarone and was intubated outside of hospital. Per chart review and  of the patient who is present prior to intubation patient was alert and oriented but was experiencing significant panic and was intubated due to concern for protection of airway. On admission, patient had elevated troponin of 2. He has been maintained in sinus rhythm on amiodarone drip and was started on Heparin drip for anticoagulation, Cardiology is following. Chest xray showed pulmonary edema. CT head negative of infarction, hemorrhage or mass.   Per , had congenital heart defect with multiple surgeries in the past. 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 He denies any drug use prior to event. Denies any known history of sudden cardiac death in family.     Consultants:  Cardiology: Dr. Toro   PMA: Dr. Irving     Procedures:  Intubation 8/29    Interval Events:    Extubated overnight, doing well on 1-2L NC  A&O x 4, will ambulate today as tolerated   Soft BP, continue IVF for now   Continue amio ggt.  Patient had coronary angiography on 8/30/18 which revealed normal epicardial coronary arteries without coronary artery disease and a  normal aortic valve mechanical leaflet motion  Speech evaluation, thick nectars with likely advancement.  Cardiology following, recommends AICD placement today,  Holding anticoagulation for possible procedure, goal INR 2.5-3.5  Will need repeat echo prior to discharge   MRI scheduled this a.m. as well.  Per cardiology to hold heparin, which is on for history of mechanical aortic valve ,3-4 hours before implantation of S/Q ICD    Imaging:  DX-CHEST-PORTABLE (1 VIEW)   Final Result      1.  Persistently enlarged cardiac silhouette   2.  Unchanged airspace disease, likely pulmonary edema      DX-ABDOMEN FOR TUBE PLACEMENT   Final Result      Feeding tube tip at the proximal descending duodenum.      ECHOCARDIOGRAM COMP W/O CONT   Final Result      DX-CHEST-PORTABLE (1 VIEW)   Final Result      1.  Alveolar pulmonary edema, less likely pneumonia   2.  Line and tube position as described above      CT-HEAD W/O   Final Result      Head CT without contrast within normal limits. No evidence of acute cerebral infarction, hemorrhage or mass lesion.      MR-CARDIAC MORPH/FUNC WITH & W/O    (Results Pending)         Review of Systems   Constitutional: Negative for chills and fever.   HENT: Negative for nosebleeds.    Eyes: Negative for blurred vision and double vision.   Respiratory: Negative for shortness of breath.    Cardiovascular: Negative for chest pain and palpitations.   Gastrointestinal: Negative for abdominal pain, nausea and vomiting.   Neurological: Negative for dizziness, sensory change, speech change, focal weakness and headaches.   Psychiatric/Behavioral: Negative for substance abuse. The patient is not nervous/anxious.        PHYSICAL EXAM  Gen: young male, well nourished, awake, alert and conversant, in NAD  HEENT: NC/AT, no scleral icterus, no conjunctival injection, mucous membranes pink and moist  Neck: Supple, no lymphadenopathy.  Cardiac: S1S2, RRR, 3/6 systolic murmur, no JVD.  Respiratory: Normal effort,  symmetrical, CTA b/l.  Abdomen: BS+, soft, NT/ND, no rebound/guarding, no palpable organomegaly.  Ext: No edema, 2+ DP pulses b/l.  Skin: Warm, dry. No rashes or erythema.   Neuro:  A&O x 4, conversant, no focal deficits, CN II-XII intact.    Respiratory:     Respiration: 16, Pulse Oximetry: 96 %    Chest Tube Drains:    Recent Labs      08/29/18   0219  08/29/18   0522   ISTATAPH  7.339*  7.360*   ISTATAPCO2  48.6*  42.7*   ISTATAPO2  248*  156*   ISTATATCO2  28  25   [id number]  100*  99   ISTATARTHCO3  26.2*  24.1   ISTATARTBE  0  -1   ISTATTEMP  37.4 C  37.9 C   ISTATFIO2  100  60   ISTATSPEC  Arterial  Arterial   ISTATAPHTC  7.333*  7.347*   [id number]  250*  162*       HemoDynamics:  Pulse: 60, Heart Rate (Monitored): 60 NIBP: 111/63      Neuro:  A&O x 4, conversant, no focal deficits, CN II-XII intact.    Fluids:          Intake/Output Summary (Last 24 hours) at 08/31/18 1038  Last data filed at 08/31/18 0600   Gross per 24 hour   Intake          2071.25 ml   Output             2525 ml   Net          -453.75 ml         Weight: 68.1 kg (150 lb 2.1 oz)  Body mass index is 22.83 kg/m².    Recent Labs      08/29/18 0128  08/29/18   0247  08/30/18   0530  08/31/18   0542   SODIUM  141   --   139  140   POTASSIUM  3.8   --   4.0  3.6   CHLORIDE  106   --   107  110   CO2  23   --   26  25   BUN  27*   --   19  11   CREATININE  0.97   --   0.73  0.64   MAGNESIUM   --   2.0  1.8  1.6   PHOSPHORUS   --   3.8  1.5*  1.6*   CALCIUM  8.4*   --   7.8*  7.9*       GI/Nutrition:  Recent Labs      08/29/18   0128 08/30/18   0530  08/31/18   0542   ALTSGPT  33   --    --    ASTSGOT  40   --    --    ALKPHOSPHAT  68   --    --    TBILIRUBIN  1.0   --    --    GLUCOSE  119*  117*  95       Heme:  Recent Labs      08/29/18   0128  08/29/18   0247  08/30/18   0530  08/30/18   0641  08/31/18   0038  08/31/18   0542  08/31/18   0730   RBC  5.66   --    --   4.12*   --   3.85*   --    HEMOGLOBIN  17.5   --    --   13.2*   --    12.1*   --    HEMATOCRIT  50.8   --    --   37.1*   --   35.4*   --    PLATELETCT  238   --    --   157*   --   155*   --    PROTHROMBTM   --   31.2*  26.6*   --    --   20.2*   --    APTT   --   30.7   --    --   80.5*   --   110.9*   INR   --   3.04*  2.49*   --    --   1.76*   --        Infectious Disease:  Monitored Temp 2  Av.8 °C (100 °F)  Min: 37.3 °C (99.1 °F)  Max: 38.2 °C (100.8 °F)  Temp  Av.6 °C (69 °F)  Min: 3.1 °C (37.6 °F)  Max: 38 °C (100.4 °F)  Recent Labs      18   0128  18   0641  18   0542   WBC  14.9*  7.2  6.4   NEUTSPOLYS  87.60*  78.00*  77.70*   LYMPHOCYTES  7.10*  13.60*  14.00*   MONOCYTES  4.20  6.90  6.10   EOSINOPHILS  0.10  0.80  0.90   BASOPHILS  0.50  0.40  0.80   ASTSGOT  40   --    --    ALTSGPT  33   --    --    ALKPHOSPHAT  68   --    --    TBILIRUBIN  1.0   --    --        Meds:  • potassium phosphate ivpb  30 mmol 30 mmol (18 0757)   • MD Alert...Warfarin per Pharmacy       • NS   75 mL/hr at 18 0625   • heparin  2,600 Units      And   • heparin   950 Units/hr (18 0829)   • warfarin  7.5 mg      And   • warfarin  10 mg     • Respiratory Care per Protocol       • MD Alert...Adult ICU Electrolyte Replacement per Pharmacy       • ipratropium-albuterol  3 mL     • Pharmacy       • oxyCODONE immediate-release  5 mg      Or   • oxyCODONE immediate-release  10 mg     • senna-docusate  2 Tab      And   • polyethylene glycol/lytes  1 Packet      And   • magnesium hydroxide  30 mL      And   • bisacodyl  10 mg     • acetaminophen  650 mg     • diphenhydrAMINE  25 mg     • ondansetron  4 mg            Assessment and Plan:  * Cardiac arrest (HCC)- (present on admission)   Assessment & Plan    - Transferred from [facility] due to presumed cardiac arrest, required CPR and 2 shocks with AED, unclear of duration of even. Per chart, pt was alert and able to answer questions post ROCS, no records currently available, they have been  requested. Patient has history of congenital cardiac malformation, aortic valve replacement at age 18 and on coumadin.   - Started on amiodarone drip prior to arrival.  -Patient had a coronary angiography done on 8/30/18, which revealed normal left main, left anterior descending, left circumflex, right coronary with moderate calibers and no CAD.  -Patient had a aortic valve fluoroscopy on 8/30/80 which revealed normal annular stability without rocking, and a normal leaflet motion.  -Appreciate cardiology recommendations  -Magnesium 1.6 phosphorus 1.6 potassium 3.6 on 8/31/18.  PLAN  - trop elevated 2.0 ->3.27, likely secondary to shock/CPR, will CTM  - Has remained in NSR, continue amiodarone drip for secondary prevention per cards  - echocardiogram shows reduced EF 35% may be secondary to cardiac event, will repeat prior to discharge   - keep Mg >2, K>4   -For AICD placement, to hold heparin 3-4 hours before implantation of S/Q ICD        Aortic stenosis- (present on admission)   Assessment & Plan    - S/p aortic valve replacement in 2003 for aortic stenosis, on coumadin  -Hold heparin for possible procedure, 3-4 hours before implantation of SQ ICD will initiate if INR <2.5  -Aortic valve fluoroscopy shows normal annular stability without rocking and a normal leaflet motion        VONDA (acute kidney injury) (HCC)- (present on admission)   Assessment & Plan    Mild VONDA on admission, baseline renal function not known.   - elevated BUN/Cr 27/0.87 suggesting pre-renal   - possibly due to cardiac event vs dehydration from being out at [employer]   - continue IVF  - will continue to monitor         Hyperglycemia- (present on admission)   Assessment & Plan    -Patient mildly hyperglycemic on admission, blood glucose 119. Likely due to stress response   - will continue to monitor   -  Ha1c ordered        Leukocytosis   Assessment & Plan    Wbc 14.9 on admission, no evidence of infection, afebrile. Likely secondary to  stress from cardiac event   - resolved         Ventilator dependent (HCC)- (present on admission)   Assessment & Plan    - Intubated prior to arrival for concern of airway protection   - CXR shows pulmonary edema, likely due to arrhythmia and arrest, no evidence of consolidaton   -  echocardiogram showed dilated IVC and reduced EF 35%   - extubated 8/29, doing well on 1-2L NC,  Wean as tolerated         Elevated troponin- (present on admission)   Assessment & Plan    - Likely secondary to ventricular arrhythmia and CPR/Shock  - EKG without ischemic changes  -Trop came down, stop trending              Quality Measures:  Lines: 2 PIV     Reyes Catheter: YES, remove today   DVT Prophylaxis: Heparin   Ulcer Prophylaxis: NA  Antibiotics: NA      CODE STATUS: FULL  DISPO: ICU

## 2018-08-31 NOTE — PROGRESS NOTES
Cardiology Progress Note               Author: Soni Culp Date & Time created: 8/31/2018  9:21 AM     Interval History:  HPI: This is a 33-year-old gentleman with history of mechanical aortic valve replacement who was at [facility] and reportedly was found unresponsive an AED was applied and he was shocked, intubated and started on amiodarone by medical staff there prior to 2300 hrs. last evening.  By care flight he received sedation and antiarrhythmics and transferred here where his EKG shows sinus rhythm and his labs show mild hypokalemia U tox only positive for marijuana.  Remainder of the history is unobtainable at this time although bystanders should be arriving to help provide clinical history there is no records in the care everywhere system from [facility] or [place].  The patient reported to be on Coumadin and we are waiting coagulation he had i-STAT labs likely done [place] [facility] [place] which show normal CBC and kidney function potassium 3.0  OOHA urine tox positive for marijuana and benzos.  AVR age 18 SF no CAD at time of surgery.  2 small TIA's INR 2.5-3.5  Trop 3.0 no EKG changes suggestive of ACS.     Echo post arrest as follows:  CONCLUSIONS  No prior study is available for comparison.   Normal left ventricular chamber size.  Left ventricular ejection fraction is visually estimated to be 35%.  Moderate concentric left ventricular hypertrophy, consider infiltrative   cardiomyopathy, CKD or long-standing hypertension.  Mid to apical anterior and septal wall akinesis.  Reduced right ventricular systolic function.  Mild mitral regurgitation.  Known mechanical aortic valve that is functioning normally with normal   transvalvular gradients.  Dilated inferior vena cava without inspiratory collapse.  Estimated right ventricular systolic pressure is 45 mmHg.     Doing well this AM no arrhythmias thru the night.  K+ 3.6 Mag 1.6, INR 1.76  On heparin gtt with H/O mechanical aortic  valve  Per Dr Gay will hold heparin 3-4 hurs before implantation of SQ ICD.  Chief Complaint:  OOHA    Review of Systems   Constitutional: Negative for chills, fever, malaise/fatigue and weight loss.   HENT: Negative for congestion and sore throat.    Eyes: Negative for blurred vision and double vision.   Respiratory: Negative for cough, sputum production, shortness of breath and wheezing.    Cardiovascular: Negative for chest pain, palpitations, orthopnea, claudication, leg swelling and PND.   Gastrointestinal: Negative for abdominal pain, heartburn, nausea and vomiting.   Genitourinary: Negative for dysuria, flank pain and frequency.   Musculoskeletal: Negative.    Skin: Negative.    Neurological: Negative for dizziness, speech change, focal weakness, seizures, loss of consciousness and headaches.   Endo/Heme/Allergies: Negative.    Psychiatric/Behavioral: Negative.        Physical Exam   Constitutional: He is oriented to person, place, and time. He appears well-developed and well-nourished.   HENT:   Head: Normocephalic and atraumatic.   Eyes: Pupils are equal, round, and reactive to light.   Neck: Normal range of motion. Neck supple. No thyromegaly present.   Cardiovascular: Normal rate and regular rhythm.  Exam reveals no gallop and no friction rub.    No murmur heard.  Good prosthetic valve sounds.   Pulmonary/Chest: Effort normal and breath sounds normal. No respiratory distress. He has no wheezes. He has no rales.   Abdominal: Soft. Bowel sounds are normal. He exhibits no distension. There is no tenderness. There is no guarding.   Musculoskeletal: Normal range of motion. He exhibits no edema.   Neurological: He is alert and oriented to person, place, and time.   Skin: Skin is warm and dry.   Psychiatric: He has a normal mood and affect.       Hemodynamics:  Temp (24hrs), Av.6 °C (69 °F), Min:3.1 °C (37.6 °F), Max:38 °C (100.4 °F)  Temperature: 38 °C (100.4 °F), Monitored Temp: 37.3 °C (99.1 °F)  Pulse   Av.8  Min: 60  Max: 96Heart Rate (Monitored): 60  NIBP: 111/63     Respiratory:    Respiration: 16, Pulse Oximetry: 96 %     Work Of Breathing / Effort: Mild  RUL Breath Sounds: Clear, RML Breath Sounds: Clear, RLL Breath Sounds: Clear, JERICA Breath Sounds: Clear, LLL Breath Sounds: Clear  Fluids:     Weight: 68.1 kg (150 lb 2.1 oz)  GI/Nutrition:  Orders Placed This Encounter   Procedures   • Diet NPO: at Midnight, Sips with Medications     Standing Status:   Standing     Number of Occurrences:   8     Order Specific Question:   Restrict to:     Answer:   Sips with Medications [3]     Lab Results:  Recent Labs      18   0128  18   0641  18   0542   WBC  14.9*  7.2  6.4   RBC  5.66  4.12*  3.85*   HEMOGLOBIN  17.5  13.2*  12.1*   HEMATOCRIT  50.8  37.1*  35.4*   MCV  89.8  90.0  91.9   MCH  30.9  32.0  31.4   MCHC  34.4  35.6*  34.2   RDW  41.0  41.8  43.3   PLATELETCT  238  157*  155*   MPV  9.4  9.5  10.0     Recent Labs      18   0128  18   0530  18   0542   SODIUM  141  139  140   POTASSIUM  3.8  4.0  3.6   CHLORIDE  106  107  110   CO2  23  26  25   GLUCOSE  119*  117*  95   BUN  27*  19  11   CREATININE  0.97  0.73  0.64   CALCIUM  8.4*  7.8*  7.9*     Recent Labs      18   0247  18   0530  18   0038  18   0542  18   0730   APTT  30.7   --   80.5*   --   110.9*   INR  3.04*  2.49*   --   1.76*   --      Recent Labs      18   012   BNPBTYPENAT  30     Recent Labs      18   0128  18   0935  18   1518   TROPONINI  2.05*  3.27*  1.48*   BNPBTYPENAT  30   --    --      Recent Labs      18   0128   TRIGLYCERIDE  119         Medical Decision Making, by Problem:  Active Hospital Problems    Diagnosis   • *Cardiac arrest ([facility]) [I46.9]   • Aortic stenosis [I35.0]   • Elevated troponin [R74.8]   • Ventilator dependent (HCC) [Z99.11]   • Leukocytosis [D72.829]   • Hyperglycemia [R73.9]   • VONDA (acute kidney injury) ([facility])  [N17.9]       Plan:  K+ 3.6 Mag 1.6, INR 1.76  On heparin gtt with H/O mechanical aortic valve  Per Dr Gay will hold heparin 3-4 hours before implantation of SQ ICD.  MRI  Scheduled this AM as well.  NPO for SQ ICD questions answered.  Consent on chart.  Patient has been recently tested in last few weeks/month for HIV. He had been on Truvada for protection.  Quality-Core Measures   Reviewed items::  EKG reviewed, Labs reviewed and Medications reviewed

## 2018-08-31 NOTE — OP REPORT
Electrophysiology Procedure Note  [facility]  2    Date of procedure: 8/31/2018     Procedure Performed: Placement of Subcutaneous AICD, Defibrillation testing    Indication: Nonischemic cardiomyopathy and cardiac arrest    Physician(s): KEIKO Gay MD    Anesthesia:  General    Anaesthesiologist: Dr Esparza    Specimen(s) Removed: None     Estimated Blood Loss: 50 cc    Complications: None    Pre Procedure ECG: Sinus rhythm    Post Procedure ECG: Sinus rhythm    DESCRIPTION OF PROCEDURE: After informed written consent, the patient was brought to the electrophysiology lab in the fasting, unsedated state. The patient was prepped and draped in the usual sterile fashion for subcutaneous ICD placement. The procedure was performed under general anaesthesia with local anesthetic. The left inframammary region was anesthetized and an incision was made. Blunt dissection was performed down to the muscle/ribs and the defibrillator pocket was formed. An incision was made at the substernal region following local anesthesia. Blunt dissection was performed down to the fascia.  A nonabsorbable suture was placed ×2 at the substernal incision.  Tunneling was performed from the substernal incision to the defibrillator pocket.  The defibrillator lead was tunneled to the substernal incision.  The lead was sewn down appropriately with 2 non-absorbable sutures at this location.  Using the tunneling tool and a tear-away sheath, the sheath was tunneled in the left parasternal region. The defibrillator lead was then deployed in this location.  The tear-away sheath was then removed.  The defibrillator lead was then attached to the defibrillator generator and placed in the pocket with a significant amount of the device posterior to the mid axillary line.  The header was sewn to the muscle to prevent slippage.  All bleeders were bovied off both incisions were irrigated with antibiotic solution.  Both incisions were closed  with 3 layers of absorbable suture.  Device testing was performed which showed adequate DFTs.  Fluoroscopy confirmed optimal device and lead placement.  A dressing was applied.  The patient was sent to recovery.      IMPLANTED DEVICE INFORMATION:    Pulse generator is a Mission model A219  Serial number [id number]      LEAD INFORMATION:  1. AICD lead is a [employer] model 3501, serial number [id number]    DEVICE PROGRAMMING:    Tachy therapy:  200 and 240    DEFIBRILLATION THRESHOLD TESTING:    DFT = 65 Joules  Charge time = 15 seconds  Shock impedance = 31 Ohms    FLUOROSCOPY TIME: 0 min    SUMMARY/CONCLUSIONS:  1. Successful SQ AICD  2. DFT <  65 Joules    RECOMMENDATIONS:  1. Admit to monitored bed.  2. Chest x-ray.  3. Follow-up in device clinic for wound check and device interrogation.

## 2018-08-31 NOTE — PROGRESS NOTES
Inpatient Anticoagulation Service Note    Date: 8/31/2018  Reason for Anticoagulation: Aortic Mechanical Valve Replacement        Hemoglobin Value: (!) 12.1  Hematocrit Value: (!) 35.4  Lab Platelet Value: (!) 155  Target INR: 2.5 to 3.5 (Higher range due to TIA on therapeutic warfarin in the past)    INR from last 7 days     Date/Time INR Value    08/31/18 0542 (!)  1.76    08/30/18 0530 (!)  2.49    08/29/18 0247 (!)  3.04        Dose from last 7 days     Date/Time Dose (mg)    08/31/18 1500  10    08/30/18 1700  7.5        Significant Interactions: Amiodarone previously given during admission, acetaminophen  Bridge Therapy: Yes  Bridge Therapy Start Date: 08/30/18  Days of Overlap Therapy: 2     Reversal Agent Administered: Not Applicable  Comments and Plan: H/H decreased from baseline, likely component of dilution as well as procedural blood loss.  Patient with ICD placement this afternoon.  Will resume heparin gtt when EP approves to resume anticoagulation following the procedure - held for 3-4 hours prior to procedure.  INR slightly decreased today to 1.76, likely due to held doses upon arrival to the hospital.  Home regimen resumed yesterday, patient to receive 10 mg dose this evening.  Will continue to follow daily while inpatient.       Pharmacist suggested discharge dosing: Home regimen appears to remain appropriate; INR upon arrival was within goal (INR of 3 with goal of 2.5-3.5).  Will continue to monitor for new DDI prior to discharge.      Thank you!   Maddy Luther, MisaD

## 2018-09-01 ENCOUNTER — APPOINTMENT (OUTPATIENT)
Dept: RADIOLOGY | Facility: MEDICAL CENTER | Age: 33
DRG: 224 | End: 2018-09-01
Attending: STUDENT IN AN ORGANIZED HEALTH CARE EDUCATION/TRAINING PROGRAM
Payer: COMMERCIAL

## 2018-09-01 ENCOUNTER — APPOINTMENT (OUTPATIENT)
Dept: RADIOLOGY | Facility: MEDICAL CENTER | Age: 33
DRG: 224 | End: 2018-09-01
Attending: NURSE PRACTITIONER
Payer: COMMERCIAL

## 2018-09-01 LAB
ANION GAP SERPL CALC-SCNC: 8 MMOL/L (ref 0–11.9)
BASOPHILS # BLD AUTO: 0.9 % (ref 0–1.8)
BASOPHILS # BLD: 0.04 K/UL (ref 0–0.12)
BUN SERPL-MCNC: 8 MG/DL (ref 8–22)
CALCIUM SERPL-MCNC: 8.2 MG/DL (ref 8.5–10.5)
CHLORIDE SERPL-SCNC: 108 MMOL/L (ref 96–112)
CO2 SERPL-SCNC: 21 MMOL/L (ref 20–33)
CREAT SERPL-MCNC: 0.62 MG/DL (ref 0.5–1.4)
EOSINOPHIL # BLD AUTO: 0.09 K/UL (ref 0–0.51)
EOSINOPHIL NFR BLD: 2 % (ref 0–6.9)
ERYTHROCYTE [DISTWIDTH] IN BLOOD BY AUTOMATED COUNT: 44 FL (ref 35.9–50)
GLUCOSE SERPL-MCNC: 90 MG/DL (ref 65–99)
HCT VFR BLD AUTO: 39.9 % (ref 42–52)
HGB BLD-MCNC: 13.5 G/DL (ref 14–18)
IMM GRANULOCYTES # BLD AUTO: 0.04 K/UL (ref 0–0.11)
IMM GRANULOCYTES NFR BLD AUTO: 0.9 % (ref 0–0.9)
INR PPP: 2.42 (ref 0.87–1.13)
LYMPHOCYTES # BLD AUTO: 0.95 K/UL (ref 1–4.8)
LYMPHOCYTES NFR BLD: 20.9 % (ref 22–41)
MAGNESIUM SERPL-MCNC: 1.7 MG/DL (ref 1.5–2.5)
MCH RBC QN AUTO: 31.4 PG (ref 27–33)
MCHC RBC AUTO-ENTMCNC: 33.8 G/DL (ref 33.7–35.3)
MCV RBC AUTO: 92.8 FL (ref 81.4–97.8)
MONOCYTES # BLD AUTO: 0.35 K/UL (ref 0–0.85)
MONOCYTES NFR BLD AUTO: 7.7 % (ref 0–13.4)
NEUTROPHILS # BLD AUTO: 3.08 K/UL (ref 1.82–7.42)
NEUTROPHILS NFR BLD: 67.6 % (ref 44–72)
NRBC # BLD AUTO: 0 K/UL
NRBC BLD-RTO: 0 /100 WBC
PHOSPHATE SERPL-MCNC: 2.4 MG/DL (ref 2.5–4.5)
PLATELET # BLD AUTO: 181 K/UL (ref 164–446)
PMV BLD AUTO: 9.3 FL (ref 9–12.9)
POTASSIUM SERPL-SCNC: 3.9 MMOL/L (ref 3.6–5.5)
PROTHROMBIN TIME: 26 SEC (ref 12–14.6)
RBC # BLD AUTO: 4.3 M/UL (ref 4.7–6.1)
SODIUM SERPL-SCNC: 137 MMOL/L (ref 135–145)
WBC # BLD AUTO: 4.6 K/UL (ref 4.8–10.8)

## 2018-09-01 PROCEDURE — 770020 HCHG ROOM/CARE - TELE (206)

## 2018-09-01 PROCEDURE — 84100 ASSAY OF PHOSPHORUS: CPT

## 2018-09-01 PROCEDURE — 700102 HCHG RX REV CODE 250 W/ 637 OVERRIDE(OP): Performed by: STUDENT IN AN ORGANIZED HEALTH CARE EDUCATION/TRAINING PROGRAM

## 2018-09-01 PROCEDURE — A9270 NON-COVERED ITEM OR SERVICE: HCPCS | Performed by: INTERNAL MEDICINE

## 2018-09-01 PROCEDURE — 99233 SBSQ HOSP IP/OBS HIGH 50: CPT | Performed by: INTERNAL MEDICINE

## 2018-09-01 PROCEDURE — 85610 PROTHROMBIN TIME: CPT

## 2018-09-01 PROCEDURE — A9270 NON-COVERED ITEM OR SERVICE: HCPCS | Performed by: STUDENT IN AN ORGANIZED HEALTH CARE EDUCATION/TRAINING PROGRAM

## 2018-09-01 PROCEDURE — 80048 BASIC METABOLIC PNL TOTAL CA: CPT

## 2018-09-01 PROCEDURE — 85025 COMPLETE CBC W/AUTO DIFF WBC: CPT

## 2018-09-01 PROCEDURE — 700102 HCHG RX REV CODE 250 W/ 637 OVERRIDE(OP): Performed by: INTERNAL MEDICINE

## 2018-09-01 PROCEDURE — 700111 HCHG RX REV CODE 636 W/ 250 OVERRIDE (IP): Performed by: STUDENT IN AN ORGANIZED HEALTH CARE EDUCATION/TRAINING PROGRAM

## 2018-09-01 PROCEDURE — 83735 ASSAY OF MAGNESIUM: CPT

## 2018-09-01 PROCEDURE — 71045 X-RAY EXAM CHEST 1 VIEW: CPT

## 2018-09-01 PROCEDURE — 700111 HCHG RX REV CODE 636 W/ 250 OVERRIDE (IP): Performed by: INTERNAL MEDICINE

## 2018-09-01 RX ORDER — WARFARIN SODIUM 7.5 MG/1
7.5 TABLET ORAL
Status: DISCONTINUED | OUTPATIENT
Start: 2018-09-01 | End: 2018-09-02 | Stop reason: HOSPADM

## 2018-09-01 RX ORDER — WARFARIN SODIUM 7.5 MG/1
7.5 TABLET ORAL
Status: DISCONTINUED | OUTPATIENT
Start: 2018-09-01 | End: 2018-09-01

## 2018-09-01 RX ORDER — FUROSEMIDE 10 MG/ML
40 INJECTION INTRAMUSCULAR; INTRAVENOUS ONCE
Status: COMPLETED | OUTPATIENT
Start: 2018-09-01 | End: 2018-09-01

## 2018-09-01 RX ORDER — WARFARIN SODIUM 10 MG/1
10 TABLET ORAL
Status: DISCONTINUED | OUTPATIENT
Start: 2018-09-01 | End: 2018-09-01

## 2018-09-01 RX ORDER — MAGNESIUM SULFATE HEPTAHYDRATE 40 MG/ML
2 INJECTION, SOLUTION INTRAVENOUS ONCE
Status: COMPLETED | OUTPATIENT
Start: 2018-09-01 | End: 2018-09-01

## 2018-09-01 RX ORDER — BISACODYL 10 MG
10 SUPPOSITORY, RECTAL RECTAL
Status: DISCONTINUED | OUTPATIENT
Start: 2018-09-01 | End: 2018-09-02 | Stop reason: HOSPADM

## 2018-09-01 RX ORDER — EMTRICITABINE AND TENOFOVIR DISOPROXIL FUMARATE 100; 150 MG/1; MG/1
TABLET, FILM COATED ORAL
COMMUNITY

## 2018-09-01 RX ORDER — POLYETHYLENE GLYCOL 3350 17 G/17G
1 POWDER, FOR SOLUTION ORAL
Status: DISCONTINUED | OUTPATIENT
Start: 2018-09-01 | End: 2018-09-02 | Stop reason: HOSPADM

## 2018-09-01 RX ORDER — AMOXICILLIN 250 MG
2 CAPSULE ORAL 2 TIMES DAILY
Status: DISCONTINUED | OUTPATIENT
Start: 2018-09-01 | End: 2018-09-02 | Stop reason: HOSPADM

## 2018-09-01 RX ORDER — WARFARIN SODIUM 10 MG/1
10 TABLET ORAL
Status: DISCONTINUED | OUTPATIENT
Start: 2018-09-05 | End: 2018-09-02 | Stop reason: HOSPADM

## 2018-09-01 RX ORDER — POTASSIUM CHLORIDE 20 MEQ/1
40 TABLET, EXTENDED RELEASE ORAL ONCE
Status: COMPLETED | OUTPATIENT
Start: 2018-09-01 | End: 2018-09-01

## 2018-09-01 RX ADMIN — OXYCODONE HYDROCHLORIDE AND ACETAMINOPHEN 1 TABLET: 5; 325 TABLET ORAL at 02:23

## 2018-09-01 RX ADMIN — OXYCODONE HYDROCHLORIDE AND ACETAMINOPHEN 2 TABLET: 5; 325 TABLET ORAL at 21:24

## 2018-09-01 RX ADMIN — Medication 2 TABLET: at 06:12

## 2018-09-01 RX ADMIN — FUROSEMIDE 40 MG: 10 INJECTION, SOLUTION INTRAMUSCULAR; INTRAVENOUS at 09:33

## 2018-09-01 RX ADMIN — ASPIRIN 81 MG: 81 TABLET, CHEWABLE ORAL at 06:12

## 2018-09-01 RX ADMIN — Medication 2 TABLET: at 18:04

## 2018-09-01 RX ADMIN — MAGNESIUM SULFATE IN WATER 2 G: 40 INJECTION, SOLUTION INTRAVENOUS at 07:56

## 2018-09-01 RX ADMIN — OXYCODONE HYDROCHLORIDE AND ACETAMINOPHEN 1 TABLET: 5; 325 TABLET ORAL at 06:31

## 2018-09-01 RX ADMIN — ACETAMINOPHEN 650 MG: 325 TABLET, FILM COATED ORAL at 04:47

## 2018-09-01 RX ADMIN — POTASSIUM CHLORIDE 40 MEQ: 1500 TABLET, EXTENDED RELEASE ORAL at 07:56

## 2018-09-01 RX ADMIN — WARFARIN SODIUM 7.5 MG: 7.5 TABLET ORAL at 21:22

## 2018-09-01 RX ADMIN — OXYCODONE HYDROCHLORIDE AND ACETAMINOPHEN 1 TABLET: 5; 325 TABLET ORAL at 14:35

## 2018-09-01 ASSESSMENT — PAIN SCALES - GENERAL
PAINLEVEL_OUTOF10: 5
PAINLEVEL_OUTOF10: 6
PAINLEVEL_OUTOF10: 3
PAINLEVEL_OUTOF10: 5
PAINLEVEL_OUTOF10: 3
PAINLEVEL_OUTOF10: 2
PAINLEVEL_OUTOF10: 5
PAINLEVEL_OUTOF10: 3
PAINLEVEL_OUTOF10: 5
PAINLEVEL_OUTOF10: 0
PAINLEVEL_OUTOF10: 4
PAINLEVEL_OUTOF10: 1
PAINLEVEL_OUTOF10: 3
PAINLEVEL_OUTOF10: 5
PAINLEVEL_OUTOF10: 0
PAINLEVEL_OUTOF10: 3

## 2018-09-01 ASSESSMENT — ENCOUNTER SYMPTOMS
CLAUDICATION: 0
ABDOMINAL PAIN: 0
CHILLS: 0
PALPITATIONS: 0
SHORTNESS OF BREATH: 0
HEADACHES: 0
SENSORY CHANGE: 0
FOCAL WEAKNESS: 0
ORTHOPNEA: 0
BLURRED VISION: 0
NAUSEA: 0
SPEECH CHANGE: 0
DOUBLE VISION: 0
DIZZINESS: 0
COUGH: 0
DEPRESSION: 0
NERVOUS/ANXIOUS: 0
PND: 0
FEVER: 0
VOMITING: 0
SHORTNESS OF BREATH: 1
SPUTUM PRODUCTION: 0

## 2018-09-01 ASSESSMENT — COGNITIVE AND FUNCTIONAL STATUS - GENERAL
SUGGESTED CMS G CODE MODIFIER DAILY ACTIVITY: CH
SUGGESTED CMS G CODE MODIFIER MOBILITY: CH
MOBILITY SCORE: 24
DAILY ACTIVITIY SCORE: 24

## 2018-09-01 ASSESSMENT — LIFESTYLE VARIABLES
EVER_SMOKED: YES
ALCOHOL_USE: NO
PACK_YEARS: 0
EVER_SMOKED: YES
SUBSTANCE_ABUSE: 0

## 2018-09-01 ASSESSMENT — PATIENT HEALTH QUESTIONNAIRE - PHQ9
1. LITTLE INTEREST OR PLEASURE IN DOING THINGS: NOT AT ALL
2. FEELING DOWN, DEPRESSED, IRRITABLE, OR HOPELESS: NOT AT ALL
SUM OF ALL RESPONSES TO PHQ9 QUESTIONS 1 AND 2: 0

## 2018-09-01 NOTE — PROGRESS NOTES
Inpatient Anticoagulation Service Note    Date: 9/1/2018  Reason for Anticoagulation: Aortic Mechanical Valve Replacement        Hemoglobin Value: (!) 13.5  Hematocrit Value: (!) 39.9  Lab Platelet Value: 181  Target INR: 2.5 to 3.5 (Higher range due to TIA on therapeutic warfarin in the past)    INR from last 7 days     Date/Time INR Value    09/01/18 0628 (!)  2.42    08/31/18 0542 (!)  1.76    08/30/18 0530 (!)  2.49    08/29/18 0247 (!)  3.04        Dose from last 7 days     Date/Time Dose (mg)    09/01/18 1400  7.5    08/31/18 1500  10    08/30/18 1700  7.5        Significant Interactions: Other (Comments) (Amiodarone previously during admission, acetaminophen)  Bridge Therapy: Yes  Bridge Therapy Start Date: 09/30/18  Days of Overlap Therapy: 2 - bridge therapy discontinued by EP following ICD placement.      Reversal Agent Administered: Not Applicable  Comments and Plan: H/H improving.  Patient planned for discharge today but staying one more day per cardiology.  ICD placed 8/31 - no bridge therapy per EP.  INR increasing close to goal on patient's home regimen.  Continue home regimen this evening.  Pharmacy to follow daily while inpatient.        Pharmacist suggested discharge dosing: Home regimen appears to remain appropriate; INR upon arrival was within goal (INR of 3 with goal of 2.5-3.5).      Thank you!  Maddy Luther, PharmD

## 2018-09-01 NOTE — PROGRESS NOTES
Cardiology Progress Note               Author: Ann Pitts Date & Time created: 2018  12:26 PM       Consultation for out of hospital cardiac arrest    History of mechanical aortic valve prosthesis ([employer] aortic valve in [place] in ) on oral anticoagulation with warfarin, history of TIAs, nonischemic cardiomyopathy EF 35%        Coronary angiography 18 showed normal epicardial coronary arteries, normal aortic valve mechanical leaflet motion      Underwent successful placement of subcutaneous AICD on 18, [employer]      Underwent cardiac MR on 18, discussed with Dr. Toro,  showed unremarkable cardiac MR          Blood pressure 113/63  Heart rate 60's SR      Review of Systems   Respiratory: Negative for cough and shortness of breath.    Cardiovascular: Negative for chest pain, palpitations, orthopnea, claudication, leg swelling and PND.       Physical Exam   Constitutional: He is oriented to person, place, and time. He appears well-developed.   Eyes: Conjunctivae are normal.   Neck: No JVD present. No thyromegaly present.   Cardiovascular: Regular rhythm.    Pulses:       Carotid pulses are 2+ on the right side, and 2+ on the left side.       Radial pulses are 2+ on the right side, and 2+ on the left side.        Posterior tibial pulses are 2+ on the right side, and 2+ on the left side.   Pulmonary/Chest: He has no wheezes.   Abdominal: Soft.   Musculoskeletal: He exhibits no edema.   Neurological: He is alert and oriented to person, place, and time.   Skin: Skin is warm and dry.   Pacemaker site uncomplicated  New dressing  applied        Hemodynamics:  Temp (24hrs), Av.8 °C (98.3 °F), Min:36.6 °C (97.9 °F), Max:37.1 °C (98.7 °F)  Temperature: 37.1 °C (98.7 °F), Monitored Temp: 36.7 °C (98.1 °F)  Pulse  Av.9  Min: 58  Max: 96Heart Rate (Monitored): 62  NIBP: 113/63     Respiratory:    Respiration: (!) 21, Pulse Oximetry: 91 %     Work Of Breathing / Effort: Mild  RUL  Breath Sounds: Clear, RML Breath Sounds: Clear, RLL Breath Sounds: Clear, JERICA Breath Sounds: Clear, LLL Breath Sounds: Clear  Fluids:  Date 09/01/18 0700 - 09/02/18 0659   Shift 3902-4139 0855-9456 4735-5186 24 Hour Total   I  N  T  A  K  E   I.V. 50   50    Shift Total 50   50   O  U  T  P  U  T   Shift Total       Weight (kg) 68.1 68.1 68.1 68.1          GI/Nutrition:  Orders Placed This Encounter   Procedures   • Diet Order Cardiac     Standing Status:   Standing     Number of Occurrences:   1     Order Specific Question:   Diet:     Answer:   Cardiac [6]     Order Specific Question:   Miscellaneous modifications:     Answer:   Vegetarian [13]     Lab Results:  Recent Labs      08/30/18   0641  08/31/18   0542  09/01/18   0628   WBC  7.2  6.4  4.6*   RBC  4.12*  3.85*  4.30*   HEMOGLOBIN  13.2*  12.1*  13.5*   HEMATOCRIT  37.1*  35.4*  39.9*   MCV  90.0  91.9  92.8   MCH  32.0  31.4  31.4   MCHC  35.6*  34.2  33.8   RDW  41.8  43.3  44.0   PLATELETCT  157*  155*  181   MPV  9.5  10.0  9.3     Recent Labs      08/30/18   0530  08/31/18   0542  09/01/18   0628   SODIUM  139  140  137   POTASSIUM  4.0  3.6  3.9   CHLORIDE  107  110  108   CO2  26  25  21   GLUCOSE  117*  95  90   BUN  19  11  8   CREATININE  0.73  0.64  0.62   CALCIUM  7.8*  7.9*  8.2*     Recent Labs      08/30/18   0530  08/31/18   0038  08/31/18   0542  08/31/18   0730  09/01/18   0628   APTT   --   80.5*   --   110.9*   --    INR  2.49*   --   1.76*   --   2.42*         Recent Labs      08/29/18   1518   TROPONINI  1.48*             Medical Decision Making, by Problem:  Active Hospital Problems    Diagnosis   • *Cardiac arrest (HCC) [I46.9]   • Aortic stenosis [I35.0]   • Elevated troponin [R74.8]   • Ventilator dependent (HCC) [Z99.11]   • Leukocytosis [D72.829]   • Hyperglycemia [R73.9]   • VONDA (acute kidney injury) (HCC) [N17.9]       Plan:    Chest x-ray 9/1/18, unremarkable, no pneumothorax      Device was interrogated this morning,  functioning well      Continue with aspirin and Coumadin      Target INR 2.5- 3.5 (due to TIA)      INR this morning 2.42      EF on presentation 35%    -Cardiac MR unremarkable  -May not need to start GOMT  -Will discuss with Dr Toro      Plan for DC in am      Quality-Core Measures   Reviewed items::  Medications reviewed and Labs reviewed

## 2018-09-01 NOTE — DISCHARGE SUMMARY
Internal Medicine Discharge Summary  Note Author: Jacqueline Kamara M.D.       Admit Date:  8/29/2018       Discharge Date:  9/2/18    Service:   [facility] team  Attending Physician(s): Dr. Vicente   Senior Resident(s): Dr. Gunter   PCP: No primary care provider on file.       Primary Diagnosis:   Cardiac arrest secondary to possible nonischemic cardiomyopathy requiring CPR and defibrillation.    Secondary Diagnoses:                Principal Problem:    Cardiac arrest (HCC) POA: Yes  Active Problems:    Aortic stenosis POA: Yes    Elevated troponin POA: Yes    Ventilator dependent (HCC) POA: Yes    Leukocytosis POA: Unknown    Hyperglycemia POA: Yes    VONDA (acute kidney injury) (HCC) POA: Yes  Resolved Problems:    * No resolved hospital problems. *      Hospital Summary (Brief Narrative):       34 yo male with PMHx of aortic stenosis s/p mechanical aortic valve replacement in 2003 on coumadin and 2 previous TIA events. Patient was attending [facility] and performing when he  Suffered cardiac arrest requiring CPR and 2 AED shocks, time frame of event unclear. He was given loading dose of amiodarone and was intubated outside of hospital. Per chart review and  of the patient who is present prior to intubation patient was alert and oriented but was experiencing significant panic and was intubated due to concern for protection of airway. On admission, patient had elevated troponin of 2. He has been maintained in sinus rhythm on amiodarone drip and was started on Heparin drip for anticoagulation, Cardiology is following. Chest xray showed pulmonary edema. CT head negative of infarction, hemorrhage or mass.   Per , had congenital heart defect with multiple surgeries in the past. 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 He denies any  drug use prior to event. Denies any known history of sudden cardiac death in family.   Patient was admitted into the ICU, for monitoring, and had echocardiogram done which shows reduced ejection fraction of 35% likely secondary to cardiac event and  a coronary angiography done on 8/30/18  revealed normal left main, left anterior descending, left circumflex, right coronary with moderate calibers and no CAD.  Patient had a aortic valve fluoroscopy on 8/30/18, which revealed normal annular stability without rocking and a normal leaflet motion.   Patient was admitted in the ICU and was monitored for arrhythmias, electrolyte repletion, continued on that it heparin as patient has a history of a mechanical aortic valve, and was scheduled by cardiology for implantation of a SQ ICD, [place] model A219, serial #[id number]  was placed on 8/31/2018, with a DFT<65 J, with a follow-up in device clinic for wound check and device interrogation.  He will be discharged on his home warfarin dosage of , 7.5 mg once daily on weekdays, and 10 mg on Wednesdays and Fridays, until he is followed up at the Coumadin clinic, and by his cardiologist, and PCP, in [facility] in [place], where he gets his follow-ups done, on 9/4/18.       Consultants:  PMA-Dr. Landon Irving  Cardiology-Dr. Sho Toro.  Electro physiology-Dr. Manas Gay.  Interventional cardiology-Dr. Alex Means.      Patient /Hospital Summary (Details -- Problem Oriented) :          Aortic stenosis   Assessment & Plan    - S/p aortic valve replacement in 2003 for aortic stenosis, on coumadin  -Aortic valve fluoroscopy shows normal annular stability without rocking and a normal leaflet motion  -S/P SQ ICD and warfarin reinitiated.  INR at 2.73 at time of discharge.        * Cardiac arrest (HCC)   Assessment & Plan    - Transferred from [facility] due to presumed cardiac arrest, required CPR and 2 shocks with AED, unclear of duration of  even. Per chart, pt was alert and able to answer questions post ROCS, no records currently available, they have been requested. Patient has history of congenital cardiac malformation, aortic valve replacement at age 18 and on coumadin.   - Started on amiodarone drip prior to arrival.  -Patient had a coronary angiography done on 8/30/18, which revealed normal left main, left anterior descending, left circumflex, right coronary with moderate calibers and no CAD.  -Patient had a aortic valve fluoroscopy on 8/30/80 which revealed normal annular stability without rocking, and a normal leaflet motion.  -Appreciate cardiology recommendations  -Magnesium 1.6 phosphorus 1.6 potassium 3.6 on 8/31/18.  PLAN  - trop trended down.  - Has remained in NSR.   - echocardiogram shows reduced EF 35% may be secondary to cardiac event, but repeat cardiac MRI showed significant improvement in EF.   -Patient had AICD placed.  -Patient takes warfarin 7.5 mg on weekdays and 10 mg on Wednesdays and Fridays, and is trying to be scheduled by Betty in [place] on Tuesday 9/4/2018 both with the cardiologist and the Coumadin clinic.        VONDA (acute kidney injury) (HCC)   Assessment & Plan    Mild VONDA on admission, baseline renal function not known.   -At admission elevated BUN/Cr 27/0.87 suggesting pre-renal   - possibly due to cardiac event vs dehydration from being out at [facility] man   -At time of discharge, BUN 10, creatinine 0.7.  -Resolved        Hyperglycemia   Assessment & Plan    -Patient mildly hyperglycemic on admission, blood glucose 119. Likely due to stress response   - will continue to monitor   -HbA1c at 5.3        Leukocytosis   Assessment & Plan    Wbc 14.9 on admission, no evidence of infection, afebrile. Likely secondary to stress from cardiac event   - resolved         Ventilator dependent (HCC)   Assessment & Plan    - Intubated prior to arrival for concern of airway protection   - CXR shows  pulmonary edema, likely due to arrhythmia and arrest, no evidence of consolidaton   -  echocardiogram showed dilated IVC and reduced EF 35%   - extubated 8/29, and weaned off oxygen.  -Satting at 95% on room air at time of discharge.        Elevated troponin   Assessment & Plan    - Likely secondary to ventricular arrhythmia and CPR/Shock  - EKG without ischemic changes  -Trop came down, stopped trending                Procedures:        Coronary angiography done on 8/30/18 which revealed normal left main, left anterior descending, left circumflex, right coronary with moderate calibers and no CAD.  Patient had an aortic valve fluoroscopy on 8/30/18 which revealed normal annular stability without rocking  and a normal leaflet motion.  AICD placement on 8/31/18, with defibrillation testing for nonischemic cardiomyopathy and cardiac arrest, with fluoroscopy confirming optimal device and replacement.  Imaging/ Testing:      CT head without contrast on 8/29/2018, reveals no evidence of acute cerebral infarction, hemorrhage, mass lesion.  Portable chest x-ray on 8/29/2018 reveals alveolar pulmonary edema, less likely pneumonia.  Portable chest x-ray on 9/1/2018 for comparison shows placement of a left-sided  subcutaneous pacemaker projecting over the right heart border with an enlarged cardiac silhouette , and no pleural fluid collections.  Echocardiogram done on 8/29/2018, reveals a left ventricular ejection fraction of 35% with moderate concentric left ventricular hypertrophy, consider infiltrative cardiomyopathy, CKD, long-standing hypertension.  Cardiac MRI done on 8/31/2018 and results awaited to rule out any infiltrative cardiac disease.  Discharge Medications:         Medication Reconciliation: Completed       Medication List      CONTINUE taking these medications      Instructions   TRUVADA 100-150 MG Tabs  Generic drug:  Emtricitabine-Tenofovir DF   Take  by mouth.     * warfarin 7.5 MG Tabs  Commonly known as:   COUMADIN   Take 7.5 mg by mouth.  Dose:  7.5 mg     * warfarin 10 MG Tabs  Commonly known as:  COUMADIN   Take 10 mg by mouth every day.  Dose:  10 mg        * This list has 2 medication(s) that are the same as other medications prescribed for you. Read the directions carefully, and ask your doctor or other care provider to review them with you.                Can use .DISCHARGEMEDSLIST if going to another facility          Medication List      CONTINUE taking these medications      Instructions   TRUVADA 100-150 MG Tabs  Generic drug:  Emtricitabine-Tenofovir DF   Take  by mouth.     * warfarin 7.5 MG Tabs  Commonly known as:  COUMADIN   Take 7.5 mg by mouth.  Dose:  7.5 mg     * warfarin 10 MG Tabs  Commonly known as:  COUMADIN   Take 10 mg by mouth every day.  Dose:  10 mg        * This list has 2 medication(s) that are the same as other medications prescribed for you. Read the directions carefully, and ask your doctor or other care provider to review them with you.              Disposition:    Patient is to be followed up at Coumadin clinic and by his cardiologist in [facility] in [place].  Patient has talked to   in [facility] who is arranging an appointment with his cardiologist and in the Coumadin clinic on Tuesday, 9/4/2018.  Patient is for home with follow-up in [place] with the above people and his primary care physician  Diet:   As tolerated low-salt diet  Activity:   As tolerated  Instructions:      Patient has been instructed to follow-up with his PCP.  Patient has been instructed to follow-up with his cardiologist in [facility] in [place].  Patient is for regular follow-up on his Coumadin clinic, with appointment taken on 9/4/2018, at [facility]  The patient was instructed to return to the ER in the event of worsening symptoms. I have counseled the patient on the importance of compliance and the patient has  agreed to proceed with all medical recommendations and follow up plan indicated above.   The patient understands that all medications come with benefits and risks. Risks may include permanent injury or death and these risks can be minimized with close reassessment and monitoring.        Primary Care Provider:  No primary care provider on file.    Discharge summary faxed to primary care provider:  Deferred  Copy of discharge summary given to the patient: Completed      Follow up appointment details :      Follow-up appointments with primary care physician, cardiologist, and the Coumadin clinic, at [facility] in [place], as the patient has his primary care and specialist follow-up there.  Tentative appointment on 9/4/2018.  Pending Studies:        None;   Cardiac MRI was performed on 8/31/2018.  Awaiting results, but preliminary results were discussed with the patient by the cardiologist.     Time spent on discharge day patient visit, preparing discharge paperwork and arranging for patient follow up.    Summary of follow up issues:   Follow-up with primary care, cardiologist, Coumadin clinic at [facility].  Keep up the appointment as scheduled for follow-up on 9/4/2018.  Patient had an successful SQ AICD implanted device, which is a pulse generator is a [employer] model A219, with a serial #[id number], with the device programming tacky therapy 200 and 240, with defibrillation threshold testing, DFT 65 J, charge time 15 seconds, shock impedance 31 ohms.    Discharge Time (Minutes) :  45 Min.  Hospital Course Type:  Inpatient Stay >2 midnights      Condition on Discharge    _Stable __________________________________________________________________    Interval history/exam for day of discharge:      -Vitals are stable.   -A&O x 4, Ambulating well and satting at 91-95% on room air; ambulating sats at 95% on room air prior to discharge.  -patient has no new  complaints.    -Cardiac MRI was performed on 8/31/2018 and preliminary results were discussed with the patient today by the cardiologist Dr. Toro and Giuseppe Rodriguez.  As per cardiology MRI shows significant improvement in ejection fraction, recommend discontinuing aspirin and discharging the patient if medically stable otherwise.  -PT 28.8, INR at 2.75 today.     - Patient being followed up at [facility], patient has talked to  , who is arranging for an appointment with his cardiologist and in the Coumadin clinic, most likely on Tuesday 9/4/18.     -Patient requested for records of imaging studies performed at our hospital, spoke to - medical records department is not  working over the weekend so request form for medical records was handed to over to the patient to obtain his records next week     Imaging:  DX-CHEST-LIMITED (1 VIEW)   Final Result         Enlarged cardiac silhouette with apparent subcutaneous pacemaker in place without evidence of pneumothorax.      Interval improvement in pulmonary edema      DX-CHEST-PORTABLE (1 VIEW)   Final Result      1.  Persistently enlarged cardiac silhouette   2.  Unchanged airspace disease, likely pulmonary edema      DX-ABDOMEN FOR TUBE PLACEMENT   Final Result      Feeding tube tip at the proximal descending duodenum.      ECHOCARDIOGRAM COMP W/O CONT   Final Result      DX-CHEST-PORTABLE (1 VIEW)   Final Result      1.  Alveolar pulmonary edema, less likely pneumonia   2.  Line and tube position as described above      CT-HEAD W/O   Final Result      Head CT without contrast within normal limits. No evidence of acute cerebral infarction, hemorrhage or mass lesion.      MR-CARDIAC MORPH/FUNC WITH & W/O    (Results Pending)     Vitals:    09/02/18 0018 09/02/18 0435 09/02/18 0825 09/02/18 1155   BP: 123/74 140/74 121/91    Pulse: 62 65 76    Resp: 18 18 16    Temp: 37.6 °C (99.6 °F) 37.6 °C (99.6 °F) 36.8 °C (98.2 °F)     SpO2: 91% 92% 95% 96%   Weight:       Height:         Weight/BMI: Body mass index is 22.76 kg/m².  Pulse Oximetry: 96 % (while ambulating), O2 (LPM): 0, O2 Delivery: None (Room Air).      PHYSICAL EXAM  Gen: young male, well nourished, awake, alert and conversant, in NAD  HEENT: NC/AT, no scleral icterus, no conjunctival injection, mucous membranes pink and moist  Neck: Supple, no lymphadenopathy.  Cardiac: S1S2, RRR, 3/6 systolic murmur, no JVD.  Respiratory: Normal effort, symmetrical, CTA b/l.  Abdomen: BS+, soft, NT/ND, no rebound/guarding, no palpable organomegaly.  Ext: No edema, 2+ DP pulses b/l.  Skin: Warm, dry. No rashes or erythema.   Neuro:  A&O x 4, conversant, no focal deficits, CN II-XII intact.    Most Recent Labs:    Lab Results   Component Value Date/Time    WBC 3.9 (L) 09/02/2018 03:29 AM    RBC 4.22 (L) 09/02/2018 03:29 AM    HEMOGLOBIN 13.0 (L) 09/02/2018 03:29 AM    HEMATOCRIT 38.4 (L) 09/02/2018 03:29 AM    MCV 91.0 09/02/2018 03:29 AM    MCH 30.8 09/02/2018 03:29 AM    MCHC 33.9 09/02/2018 03:29 AM    MPV 8.8 (L) 09/02/2018 03:29 AM    NEUTSPOLYS 63.80 09/02/2018 03:29 AM    LYMPHOCYTES 23.30 09/02/2018 03:29 AM    MONOCYTES 9.70 09/02/2018 03:29 AM    EOSINOPHILS 2.10 09/02/2018 03:29 AM    BASOPHILS 0.80 09/02/2018 03:29 AM      Lab Results   Component Value Date/Time    SODIUM 138 09/02/2018 03:29 AM    POTASSIUM 3.8 09/02/2018 03:29 AM    CHLORIDE 106 09/02/2018 03:29 AM    CO2 25 09/02/2018 03:29 AM    GLUCOSE 102 (H) 09/02/2018 03:29 AM    BUN 10 09/02/2018 03:29 AM    CREATININE 0.70 09/02/2018 03:29 AM      Lab Results   Component Value Date/Time    ALTSGPT 33 08/29/2018 01:28 AM    ASTSGOT 40 08/29/2018 01:28 AM    ALKPHOSPHAT 68 08/29/2018 01:28 AM    TBILIRUBIN 1.0 08/29/2018 01:28 AM    ALBUMIN 4.4 08/29/2018 01:28 AM    GLOBULIN 2.3 08/29/2018 01:28 AM    INR 2.75 (H) 09/02/2018 03:29 AM     Lab Results   Component Value Date/Time    PROTHROMBTM 28.8 (H) 09/02/2018 03:29 AM     INR 2.75 (H) 09/02/2018 03:29 AM

## 2018-09-01 NOTE — CARE PLAN
Problem: Bowel/Gastric:  Goal: Normal bowel function is maintained or improved  Outcome: PROGRESSING AS EXPECTED  BM today      Problem: Skin Integrity  Goal: Risk for impaired skin integrity will decrease  Outcome: PROGRESSING AS EXPECTED  Pt self turning

## 2018-09-01 NOTE — PROGRESS NOTES
[facility] Progress Note      Admit Date: 8/29/2018  ICU Day: 1    Resident(s): Dr.seshagiri Che  Attending: SHERLEY GIMENEZ/ Dr. Irving    Date & Time:   9/1/2018   11:55 AM       Patient ID:    Name:             Nasim Bo     YOB: 1985  Age:                 33 y.o.  male   MRN:               [id number]    HPI:    32 yo male with PMHx of aortic stenosis s/p mechanical aortic valve replacement in 2003 on coumadin and 2 previous TIA events. Patient was attending [employer] and performing when he  Suffered cardiac arrest requiring CPR and 2 AED shocks, time frame of event unclear. He was given loading dose of amiodarone and was intubated outside of hospital. Per chart review and  of the patient who is present prior to intubation patient was alert and oriented but was experiencing significant panic and was intubated due to concern for protection of airway. On admission, patient had elevated troponin of 2. He has been maintained in sinus rhythm on amiodarone drip and was started on Heparin drip for anticoagulation, Cardiology is following. Chest xray showed pulmonary edema. CT head negative of infarction, hemorrhage or mass.   Per , had congenital heart defect with multiple surgeries in the past. 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 He denies any drug use prior to event. Denies any known history of sudden cardiac death in family.     Consultants:  Cardiology: Dr. Toro   PMA: Dr. Irving     Procedures:  Intubation 8/29    Interval Events:  Extubated overnight, doing well on 1-2L NC  A&O x 4, will ambulate today as tolerated   Soft BP, continue IVF for now   Continue amio ggt.  Patient had coronary angiography on 8/30/18 which revealed normal epicardial coronary arteries without coronary artery disease and a  normal aortic valve mechanical leaflet motion  Patient had placement of subcutaneous AICD with defibrillation testing, for nonischemic cardiomyopathy and cardiac arrest by cardiology, on 8/31/18, with fluoroscopy confirming optimal device and lead placement.   Repeat echo prior to discharge   Cardiac MRI results awaited  Chest x-ray done this a.m, reveals an enlarged cardiac silhouette, with  subcutaneous pacemaker in place without evidence of pneumothorax.  INR 2.42 PT 26.0 on 9/1/2018, patient takes Coumadin 7.5 mg on weekdays and 10 mg on Wednesdays and Fridays, at home.  Patient being followed up at [facility], patient has talked to  , who is arranging for an appointment with his cardiologist and in the Coumadin clinic, on Tuesday 9/4/18.  Seen by cardiology, nurse practitioner and for possible discharge on 09/02/2018, after discussion with Dr. Toro the cardiologist, as cardiac MR is unremarkable, and may not need to start GOMT  Patient seen by , for plan to return home for family support and establish care team and to take all needed treatment in [facility] with his established PCP and  pharmacy.  Imaging:  DX-CHEST-LIMITED (1 VIEW)   Final Result         Enlarged cardiac silhouette with apparent subcutaneous pacemaker in place without evidence of pneumothorax.      Interval improvement in pulmonary edema      DX-CHEST-PORTABLE (1 VIEW)   Final Result      1.  Persistently enlarged cardiac silhouette   2.  Unchanged airspace disease, likely pulmonary edema      DX-ABDOMEN FOR TUBE PLACEMENT   Final Result      Feeding tube tip at the proximal descending duodenum.      ECHOCARDIOGRAM COMP W/O CONT   Final Result      DX-CHEST-PORTABLE (1 VIEW)   Final Result      1.  Alveolar pulmonary edema, less likely pneumonia   2.  Line and tube position as described above      CT-HEAD W/O   Final Result      Head CT without contrast within normal limits. No  evidence of acute cerebral infarction, hemorrhage or mass lesion.      MR-CARDIAC MORPH/FUNC WITH & W/O    (Results Pending)         Review of Systems   Constitutional: Negative for chills and fever.   HENT: Negative for nosebleeds.    Eyes: Negative for blurred vision and double vision.   Respiratory: Negative for shortness of breath.    Cardiovascular: Negative for chest pain and palpitations.   Gastrointestinal: Negative for abdominal pain, nausea and vomiting.   Neurological: Negative for dizziness, sensory change, speech change, focal weakness and headaches.   Psychiatric/Behavioral: Negative for substance abuse. The patient is not nervous/anxious.        PHYSICAL EXAM  Gen: young male, well nourished, awake, alert and conversant, in NAD  HEENT: NC/AT, no scleral icterus, no conjunctival injection, mucous membranes pink and moist  Neck: Supple, no lymphadenopathy.  Cardiac: S1S2, RRR, 3/6 systolic murmur, no JVD.  Respiratory: Normal effort, symmetrical, CTA b/l.  Abdomen: BS+, soft, NT/ND, no rebound/guarding, no palpable organomegaly.  Ext: No edema, 2+ DP pulses b/l.  Skin: Warm, dry. No rashes or erythema.   Neuro:  A&O x 4, conversant, no focal deficits, CN II-XII intact.    Respiratory:     Respiration: (!) 21, Pulse Oximetry: 91 %    Chest Tube Drains:          Invalid input(s): [id number]    HemoDynamics:  Pulse: (!) 58, Heart Rate (Monitored): 62 NIBP: 113/63      Neuro:  A&O x 4, conversant, no focal deficits, CN II-XII intact.    Fluids:    Date 09/01/18 0700 - 09/02/18 0659   Shift 5987-9252 9033-5395 5325-0142 24 Hour Total   I  N  T  A  K  E   I.V. 50   50      Magnesium Sulfate Volume 50   50    Shift Total 50   50   O  U  T  P  U  T   Urine          Number of Times Voided 0 x   0 x    Stool          Number of Times Stooled 0 x   0 x    Shift Total       NET 50   50          Intake/Output Summary (Last 24 hours) at 09/01/18 1155  Last data filed at 09/01/18 0800   Gross per 24 hour   Intake                50 ml   Output              680 ml   Net             -630 ml            Body mass index is 22.83 kg/m².    Recent Labs      18   SODIUM  139  140  137   POTASSIUM  4.0  3.6  3.9   CHLORIDE  107  110  108   CO2  26  25  21   BUN  19  11  8   CREATININE  0.73  0.64  0.62   MAGNESIUM  1.8  1.6  1.7   PHOSPHORUS  1.5*  1.6*  2.4*   CALCIUM  7.8*  7.9*  8.2*       GI/Nutrition:  Recent Labs      18   GLUCOSE  117*  95  90       Heme:  Recent Labs      1841  188  18   RBC   --   4.12*   --   3.85*   --   4.30*   HEMOGLOBIN   --   13.2*   --   12.1*   --   13.5*   HEMATOCRIT   --   37.1*   --   35.4*   --   39.9*   PLATELETCT   --   157*   --   155*   --   181   PROTHROMBTM  26.6*   --    --   20.2*   --   26.0*   APTT   --    --   80.5*   --   110.9*   --    INR  2.49*   --    --   1.76*   --   2.42*       Infectious Disease:  Monitored Temp 2  Av.7 °C (98 °F)  Min: 36.6 °C (97.9 °F)  Max: 36.7 °C (98.1 °F)  Temp  Av.8 °C (98.3 °F)  Min: 36.6 °C (97.9 °F)  Max: 37.1 °C (98.7 °F)  Recent Labs      18   WBC  7.2  6.4  4.6*   NEUTSPOLYS  78.00*  77.70*  67.60   LYMPHOCYTES  13.60*  14.00*  20.90*   MONOCYTES  6.90  6.10  7.70   EOSINOPHILS  0.80  0.90  2.00   BASOPHILS  0.40  0.80  0.90       Meds:  • aspirin  81 mg     • oxyCODONE-acetaminophen  1-2 Tab     • MD Alert...Warfarin per Pharmacy       • Respiratory Care per Protocol       • ipratropium-albuterol  3 mL     • Pharmacy       • senna-docusate  2 Tab      And   • polyethylene glycol/lytes  1 Packet      And   • magnesium hydroxide  30 mL      And   • bisacodyl  10 mg     • acetaminophen  650 mg     • diphenhydrAMINE  25 mg     • ondansetron  4 mg            Assessment and Plan:  * Cardiac arrest (HCC)- (present on  admission)   Assessment & Plan    - Transferred from [facility] due to presumed cardiac arrest, required CPR and 2 shocks with AED, unclear of duration of even. Per chart, pt was alert and able to answer questions post ROCS, no records currently available, they have been requested. Patient has history of congenital cardiac malformation, aortic valve replacement at age 18 and on coumadin.   - Started on amiodarone drip prior to arrival.  -Patient had a coronary angiography done on 8/30/18, which revealed normal left main, left anterior descending, left circumflex, right coronary with moderate calibers and no CAD.  -Patient had a aortic valve fluoroscopy on 8/30/80 which revealed normal annular stability without rocking, and a normal leaflet motion.  -Appreciate cardiology recommendations  -Magnesium 1.6 phosphorus 1.6 potassium 3.6 on 8/31/18.  PLAN  - trop elevated 2.0 ->3.27, likely secondary to shock/CPR, will CTM  - Has remained in NSR, continue amiodarone drip for secondary prevention per cards  - echocardiogram shows reduced EF 35% may be secondary to cardiac event, will repeat prior to discharge   - keep Mg >2, K>4   -Patient had AICD placed.  -Patient takes warfarin 7.5 mg on weekdays and 10 mg on Wednesdays and Fridays, and is trying to be scheduled by  [facility] in [place] on Tuesday the fourth 2018 both with the cardiologist and the Coumadin clinic.  -Pulmonary exercises and awaiting cardiac MRI read.  -For ambulatory pulse oximetry to assess oxygen requirements.        Aortic stenosis- (present on admission)   Assessment & Plan    - S/p aortic valve replacement in 2003 for aortic stenosis, on coumadin  -Hold heparin for possible procedure, 3-4 hours before implantation of SQ ICD will initiate if INR <2.5  -Aortic valve fluoroscopy shows normal annular stability without rocking and a normal leaflet motion        VONDA (acute kidney injury) (HCC)- (present on admission)    Assessment & Plan    Mild VONDA on admission, baseline renal function not known.   - elevated BUN/Cr 27/0.87 suggesting pre-renal   - possibly due to cardiac event vs dehydration from being out at [employer]   - continue IVF  - will continue to monitor         Hyperglycemia- (present on admission)   Assessment & Plan    -Patient mildly hyperglycemic on admission, blood glucose 119. Likely due to stress response   - will continue to monitor   -  Ha1c ordered        Leukocytosis   Assessment & Plan    Wbc 14.9 on admission, no evidence of infection, afebrile. Likely secondary to stress from cardiac event   - resolved         Ventilator dependent (HCC)- (present on admission)   Assessment & Plan    - Intubated prior to arrival for concern of airway protection   - CXR shows pulmonary edema, likely due to arrhythmia and arrest, no evidence of consolidaton   -  echocardiogram showed dilated IVC and reduced EF 35%   - extubated 8/29, doing well on 1-2L NC,  Wean as tolerated         Elevated troponin- (present on admission)   Assessment & Plan    - Likely secondary to ventricular arrhythmia and CPR/Shock  - EKG without ischemic changes  -Trop came down, stop trending              Quality Measures:  Lines: 2 PIV     Reyes Catheter: YES, remove today   DVT Prophylaxis: Heparin   Ulcer Prophylaxis: NA  Antibiotics: NA      CODE STATUS: FULL  DISPO: ICU

## 2018-09-01 NOTE — PROGRESS NOTES
Pulmonary Critical Care Progress Note      Admit Date: 8/29/2018    Chief Complaint: OOH Cardiac Arrest    History of Present Illness: 33 y.o. male with a past medical history of aortic stenosis status post aortic valve replacement, on Coumadin, was brought to the hospital after out of hospital cardiac arrest with source CPR and AED defibrillation x 2, and was endotracheally intubated by EMS in route to the hospital impending doom.  Started on amiodarone for presumed ventricular arrhythmia for out of hospital cardiac arrest.  ICU consult was requested for evaluation and management.      Interval Events:  24 hour interval history reviewed   Tm 99.5  -449cc over last 24hr, +627cc since admit  No cxr this am  Cardiac mri pending read  Wbc 14.9->7.2->6.4->4.6  K 3.9  Mg 1.7  inr 2.4    bcx 8/29 ngtd    NS @ 75    S/P Cardiac Cath 8/30 without cad  ICD placed 8/31  Cardiac MRI pending read      Review of Systems   Constitutional: Negative for chills and fever.   HENT: Negative for congestion.    Eyes: Negative for blurred vision and double vision.   Respiratory: Positive for shortness of breath. Negative for cough and sputum production.    Cardiovascular: Negative for chest pain and palpitations.   Gastrointestinal: Negative for abdominal pain, nausea and vomiting.   Neurological: Negative for focal weakness.   Psychiatric/Behavioral: Negative for depression.   All other systems reviewed and are negative.    Physical Exam   Constitutional: He appears well-developed and well-nourished.   HENT:   Head: Normocephalic and atraumatic.   Eyes: Pupils are equal, round, and reactive to light. Conjunctivae are normal.   Neck: No tracheal deviation present.   Cardiovascular: Normal rate and intact distal pulses.    Pulmonary/Chest: He has no wheezes. He has rales.   diminished   Abdominal: Soft. He exhibits no distension. There is no tenderness.   Musculoskeletal: He exhibits no edema.   Neurological: No cranial nerve deficit.    Skin: Skin is warm and dry.   Psychiatric: He has a normal mood and affect.   Nursing note and vitals reviewed.      PFSH:  No change.    Respiratory:     Pulse Oximetry: 100 %  Chest Tube Drains:                  Invalid input(s): [id number]    HemoDynamics:  Pulse: 61, Heart Rate (Monitored): 66  NIBP: 102/74         Recent Labs      08/29/18   0935  08/29/18   1518   TROPONINI  3.27*  1.48*       Neuro:  GCS             Fluids:  Intake/Output       08/30/18 0700 - 08/31/18 0659 08/31/18 0700 - 09/01/18 0659 09/01/18 0700 - 09/02/18 0659      7633-4533 3026-4892 Total 0700-1859 8741-0366 Total 4141-8786 3981-7438 Total       Intake    P.O.  60  240 300  --  -- --  --  -- --    P.O. 60 240 300 -- -- -- -- -- --    I.V.  574.6  1752 2326.6  531  -- 531  --  -- --    Magnesium Sulfate Volume 3.3 -- 3.3 -- -- -- -- -- --    Amiodarone Volume 151.3 -- 151.3 -- -- -- -- -- --    Heparin Volume -- 252 252 81 -- 81 -- -- --    IV Volume (D5) 120 -- 120 -- -- -- -- -- --    IV Volume (LR) 300 -- 300 -- -- -- -- -- --    IV Volume (NS) -- 1500 1500 450 -- 450 -- -- --    Total Intake 634.6 1992 2626.6 531 -- 531 -- -- --       Output    Urine  2500  950 3450  980  -- 980  --  -- --    Output (mL) (Urinary Catheter Indwelling Catheter) 2500 950 3450 980 -- 980 -- -- --    Stool  --  -- --  --  -- --  --  -- --    Number of Times Stooled 0 x 0 x 0 x 1 x -- 1 x -- -- --    Total Output 2500 950 3450 980 -- 980 -- -- --       Net I/O     -1865.4 1042 -823.4 -449 -- -449 -- -- --           Recent Labs      08/30/18   0530  08/31/18   0542   SODIUM  139  140   POTASSIUM  4.0  3.6   CHLORIDE  107  110   CO2  26  25   BUN  19  11   CREATININE  0.73  0.64   MAGNESIUM  1.8  1.6   PHOSPHORUS  1.5*  1.6*   CALCIUM  7.8*  7.9*       GI/Nutrition:    Liver Function  Recent Labs      08/30/18   0530  08/31/18   0542   GLUCOSE  117*  95       Heme:  Recent Labs      08/30/18   0530  08/30/18   0641  08/31/18   0038  08/31/18   0542   18   0730  18   RBC   --   4.12*   --   3.85*   --   4.30*   HEMOGLOBIN   --   13.2*   --   12.1*   --   13.5*   HEMATOCRIT   --   37.1*   --   35.4*   --   39.9*   PLATELETCT   --   157*   --   155*   --   181   PROTHROMBTM  26.6*   --    --   20.2*   --   26.0*   APTT   --    --   80.5*   --   110.9*   --    INR  2.49*   --    --   1.76*   --   2.42*       Infectious Disease:  Monitored Temp 2  Av.1 °C (98.7 °F)  Min: 36.6 °C (97.9 °F)  Max: 37.5 °C (99.5 °F)  Temp  Av.6 °C (97.9 °F)  Min: 36.6 °C (97.9 °F)  Max: 36.6 °C (97.9 °F)  Micro: cultures reviewed  Recent Labs      18   0641  18   0542  18   06   WBC  7.2  6.4  4.6*   NEUTSPOLYS  78.00*  77.70*  67.60   LYMPHOCYTES  13.60*  14.00*  20.90*   MONOCYTES  6.90  6.10  7.70   EOSINOPHILS  0.80  0.90  2.00   BASOPHILS  0.40  0.80  0.90     Current Facility-Administered Medications   Medication Dose Frequency Provider Last Rate Last Dose   • aspirin (ASA) chewable tab 81 mg  81 mg DAILY Manas Gay M.D.   81 mg at 18 0612   • oxyCODONE-acetaminophen (PERCOCET) 5-325 MG per tablet 1-2 Tab  1-2 Tab Q4HRS PRN Manas Gay M.D.   1 Tab at 18 0631   • MD Alert...Warfarin per Pharmacy   pharmacy to dose Soni Culp A.P.N.       • NS infusion   Continuous Landon Irving M.D. 75 mL/hr at 18 3434     • Respiratory Care per Protocol   Continuous RT Lalo Wilkins M.D.       • ipratropium-albuterol (DUONEB) nebulizer solution  3 mL Q4H PRN (RT) Lalo Wilkins M.D.       • Pharmacy Consult: Enteral tube feeding - review meds/change route/product selection   PRN Martita Brown M.D.       • senna-docusate (PERICOLACE or SENOKOT S) 8.6-50 MG per tablet 2 Tab  2 Tab BID Landon Irving M.D.   2 Tab at 18 0612    And   • polyethylene glycol/lytes (MIRALAX) PACKET 1 Packet  1 Packet QDAY PRN Landon Irving M.D.        And   • magnesium hydroxide (MILK OF MAGNESIA) suspension 30 mL  30  mL QDAY PRN Landon Irving M.D.        And   • bisacodyl (DULCOLAX) suppository 10 mg  10 mg QDAY PRN Landon Irving M.D.       • acetaminophen (TYLENOL) tablet 650 mg  650 mg Q4HRS PRN Lalo Wilkins M.D.   650 mg at 09/01/18 0447   • diphenhydrAMINE (BENADRYL) injection 25 mg  25 mg Q6HRS PRN Neal Melchor, PharmD       • ondansetron (ZOFRAN) syringe/vial injection 4 mg  4 mg Q4HRS PRN Nasim Ricketts M.D.   4 mg at 08/30/18 1957     This patient's medications have not been reviewed.    Quality  Measures:  Labs reviewed, Medications reviewed and Radiology images reviewed                      Assessment/Plan:  -  Acute Hypoxic Respiratory Failure   - intubated 8/29-29   - rt/02 protocols   - is/pep   - dc ivf   - give lasix 40 now    - may need home 02 eval if does not come off 02 with diureses + pulm exercises    -  Out of hospital cardiac arrest - VF/VT   - no hypothermia due to following/purposeful   - s/p cpr + defib x 2 w ROSC   - echo EF 35%, concentric lvh, rvsp 45   - cath (-) for cad, ICD placed 8/31   - s/p cardiac mri 8/31 pending read     -  Hx of AS with mAVR   - coumadin    -  Leukocytosis   - resolved    -  Hypokalemia   - I am replacing to keep > 4    -  Hypomagnesemia    - I am replacing to keep > 2    Discussed patient condition and risk of morbidity and/or mortality with RN, RT, Therapies, Pharmacy and cardiology.

## 2018-09-01 NOTE — PROGRESS NOTES
Cardiology called again, instructed to observe and report new chest pain or other new symptoms.  Current orders stand.

## 2018-09-01 NOTE — PROGRESS NOTES
Monitor Summary: .18/.10/.48. Rhythm: SB-SR. Rate: 47-70's.     12 hour chart check.     2 RN skin check

## 2018-09-01 NOTE — DISCHARGE PLANNING
"Anticipated Discharge Disposition: Home    Action: Contacted by bedside RN, requesting this CM RN meet with pt to discuss questions regarding insurance.   Reviewed commercial payor source: [employer]  Reviewed PFA notes, they attempted to meet with pt yesterday regarding benefits and eligibility of $655.00 out of network co-pay per day.  Met with pt at bedside, sitting up in chair, Aox4, very pleasant affect. Pt's friend and , participating in conversation.  Reviewed above information r/t copay with pt. Pt sts, \"I'm relieved to hear that.  I was worried.\"  Advised this CM RN was providing information that had been obtained from chart review.  Encouraged pt to contact his insurance company for more information, sts understanding.    Pt will return to his home in [place], [place], has established PCP and pharmacy in that area and will obtain all needed Rx at that pharmacy upon discharge.   No further questions or concerns at this time.     Barriers to Discharge: None    Plan: Return home with support of family and friends, and established care team.     "

## 2018-09-02 VITALS
SYSTOLIC BLOOD PRESSURE: 121 MMHG | OXYGEN SATURATION: 96 % | WEIGHT: 149.69 LBS | BODY MASS INDEX: 22.69 KG/M2 | RESPIRATION RATE: 16 BRPM | TEMPERATURE: 98.2 F | HEART RATE: 76 BPM | HEIGHT: 68 IN | DIASTOLIC BLOOD PRESSURE: 91 MMHG

## 2018-09-02 LAB
ANION GAP SERPL CALC-SCNC: 7 MMOL/L (ref 0–11.9)
BASOPHILS # BLD AUTO: 0.8 % (ref 0–1.8)
BASOPHILS # BLD: 0.03 K/UL (ref 0–0.12)
BUN SERPL-MCNC: 10 MG/DL (ref 8–22)
CALCIUM SERPL-MCNC: 8.6 MG/DL (ref 8.5–10.5)
CHLORIDE SERPL-SCNC: 106 MMOL/L (ref 96–112)
CO2 SERPL-SCNC: 25 MMOL/L (ref 20–33)
CREAT SERPL-MCNC: 0.7 MG/DL (ref 0.5–1.4)
EOSINOPHIL # BLD AUTO: 0.08 K/UL (ref 0–0.51)
EOSINOPHIL NFR BLD: 2.1 % (ref 0–6.9)
ERYTHROCYTE [DISTWIDTH] IN BLOOD BY AUTOMATED COUNT: 41.1 FL (ref 35.9–50)
EST. AVERAGE GLUCOSE BLD GHB EST-MCNC: 105 MG/DL
GLUCOSE SERPL-MCNC: 102 MG/DL (ref 65–99)
HBA1C MFR BLD: 5.3 % (ref 0–5.6)
HCT VFR BLD AUTO: 38.4 % (ref 42–52)
HGB BLD-MCNC: 13 G/DL (ref 14–18)
IMM GRANULOCYTES # BLD AUTO: 0.01 K/UL (ref 0–0.11)
IMM GRANULOCYTES NFR BLD AUTO: 0.3 % (ref 0–0.9)
INR PPP: 2.75 (ref 0.87–1.13)
LYMPHOCYTES # BLD AUTO: 0.91 K/UL (ref 1–4.8)
LYMPHOCYTES NFR BLD: 23.3 % (ref 22–41)
MAGNESIUM SERPL-MCNC: 1.8 MG/DL (ref 1.5–2.5)
MCH RBC QN AUTO: 30.8 PG (ref 27–33)
MCHC RBC AUTO-ENTMCNC: 33.9 G/DL (ref 33.7–35.3)
MCV RBC AUTO: 91 FL (ref 81.4–97.8)
MONOCYTES # BLD AUTO: 0.38 K/UL (ref 0–0.85)
MONOCYTES NFR BLD AUTO: 9.7 % (ref 0–13.4)
NEUTROPHILS # BLD AUTO: 2.49 K/UL (ref 1.82–7.42)
NEUTROPHILS NFR BLD: 63.8 % (ref 44–72)
NRBC # BLD AUTO: 0 K/UL
NRBC BLD-RTO: 0 /100 WBC
PLATELET # BLD AUTO: 225 K/UL (ref 164–446)
PMV BLD AUTO: 8.8 FL (ref 9–12.9)
POTASSIUM SERPL-SCNC: 3.8 MMOL/L (ref 3.6–5.5)
PROTHROMBIN TIME: 28.8 SEC (ref 12–14.6)
RBC # BLD AUTO: 4.22 M/UL (ref 4.7–6.1)
SODIUM SERPL-SCNC: 138 MMOL/L (ref 135–145)
WBC # BLD AUTO: 3.9 K/UL (ref 4.8–10.8)

## 2018-09-02 PROCEDURE — 700102 HCHG RX REV CODE 250 W/ 637 OVERRIDE(OP): Performed by: INTERNAL MEDICINE

## 2018-09-02 PROCEDURE — A9270 NON-COVERED ITEM OR SERVICE: HCPCS | Performed by: INTERNAL MEDICINE

## 2018-09-02 PROCEDURE — 80048 BASIC METABOLIC PNL TOTAL CA: CPT

## 2018-09-02 PROCEDURE — 99239 HOSP IP/OBS DSCHRG MGMT >30: CPT | Performed by: INTERNAL MEDICINE

## 2018-09-02 PROCEDURE — 85610 PROTHROMBIN TIME: CPT

## 2018-09-02 PROCEDURE — 83036 HEMOGLOBIN GLYCOSYLATED A1C: CPT

## 2018-09-02 PROCEDURE — 83735 ASSAY OF MAGNESIUM: CPT

## 2018-09-02 PROCEDURE — 85025 COMPLETE CBC W/AUTO DIFF WBC: CPT

## 2018-09-02 PROCEDURE — 36415 COLL VENOUS BLD VENIPUNCTURE: CPT

## 2018-09-02 RX ADMIN — Medication 2 TABLET: at 06:22

## 2018-09-02 RX ADMIN — ASPIRIN 81 MG: 81 TABLET, CHEWABLE ORAL at 06:22

## 2018-09-02 ASSESSMENT — ENCOUNTER SYMPTOMS
PALPITATIONS: 0
HEADACHES: 0
ORTHOPNEA: 0
SHORTNESS OF BREATH: 0
CHILLS: 0
VOMITING: 0
NAUSEA: 0
ABDOMINAL PAIN: 0
CLAUDICATION: 0
PND: 0
COUGH: 0
FEVER: 0
DIZZINESS: 0

## 2018-09-02 ASSESSMENT — PAIN SCALES - GENERAL
PAINLEVEL_OUTOF10: 0

## 2018-09-02 NOTE — PROGRESS NOTES
Pt transported to [facility], report given to Mila TOMAS, pt heidi, A/Ox4, chart w/ pt, all belongings accounted for.

## 2018-09-02 NOTE — PROGRESS NOTES
Internal Medicine Interval Note  Note Author: Jacqueline Kamara M.D.     Name Nasim Bo 1985   Age/Sex 33 y.o. male   MRN [id number]   Code Status Full     After 5PM or if no immediate response to page, please call for cross-coverage  Attending/Team: Dr. Vicente / Wolfgang See Patient List for primary contact information  Call [phone] to page    1st Call - Day Intern (R1):   Dr. Kamara 2nd Call - Day Sr. Resident (R2/R3):   Dr. Gunter        Reason for interval visit  (Principal Problem)   Cardiac arrest secondary to possible nonischemic cardiomyopathy requiring CPR and defibrillation.      Interval Problem Daily Status Update  (24 hours, problem oriented, brief subjective history, new lab/imaging data pertinent to that problem)   -Vitals are stable.   -A&O x 4, Ambulating well and satting at 91-95% on room air; ambulating sats at 95% on room air prior to discharge.  -patient has no new complaints.    -Cardiac MRI was performed on 2018 and preliminary results were discussed with the patient today by the cardiologist Dr. Toro and Giuseppe Rodriguez.  As per cardiology MRI shows significant improvement in ejection fraction, recommend discontinuing aspirin and discharging the patient if medically stable otherwise.  -PT 28.8, INR at 2.75 today.     _ Patient being followed up at [facility], patient has talked to  , who is arranging for an appointment with his cardiologist and in the Coumadin clinic, most likely on 18.     -Patient requested for records of imaging studies performed at our hospital, spoke to - medical records department is not  working over the weekend so request form for medical records was handed to over to the patient to obtain his records next week     Review of Systems   Constitutional: Negative for chills and fever.   Respiratory: Negative for cough and shortness of breath.    Cardiovascular: Negative for chest pain  and palpitations.   Gastrointestinal: Negative for abdominal pain, nausea and vomiting.   Genitourinary: Negative for dysuria.   Neurological: Negative for dizziness and headaches.       Disposition/Barriers to discharge:   Home    Consultants/Specialty  Cardiology-Dr. Sho Toro.  Electro physiology-Dr. Manas Gay.  Interventional cardiology-Dr. Alex Means.    PCP: No primary care provider on file.      Quality Measures  Quality-Core Measures   Reviewed items::  Labs reviewed, EKG reviewed, Radiology images reviewed and Medications reviewed  Reyes catheter::  No Reyes  DVT prophylaxis pharmacological::  Warfarin (Coumadin)          Physical Exam       Vitals:    09/02/18 0018 09/02/18 0435 09/02/18 0825 09/02/18 1155   BP: 123/74 140/74 121/91    Pulse: 62 65 76    Resp: 18 18 16    Temp: 37.6 °C (99.6 °F) 37.6 °C (99.6 °F) 36.8 °C (98.2 °F)    SpO2: 91% 92% 95% 96%   Weight:       Height:         Body mass index is 22.76 kg/m². Weight: 67.9 kg (149 lb 11.1 oz)  Oxygen Therapy:  Pulse Oximetry: 96 % (while ambulating), O2 (LPM): 0, O2 Delivery: None (Room Air)    Physical Exam   Constitutional: He is oriented to person, place, and time. No distress.   HENT:   Mouth/Throat: Oropharynx is clear and moist.   Eyes: Conjunctivae and EOM are normal.   Neck: No JVD present.   Cardiovascular: Normal rate, regular rhythm and normal heart sounds.    No murmur heard.  Pulmonary/Chest: Breath sounds normal. He has no wheezes.   Abdominal: Soft. Bowel sounds are normal. He exhibits no distension. There is no tenderness.   Musculoskeletal: He exhibits no edema.   Lymphadenopathy:     He has no cervical adenopathy.   Neurological: He is alert and oriented to person, place, and time. Gait normal.             Assessment/Plan     * Cardiac arrest (HCC)- (present on admission)   Assessment & Plan    - Transferred from [facility] due to presumed cardiac arrest, required CPR and 2 shocks with AED, unclear of  duration of even. Per chart, pt was alert and able to answer questions post ROCS, no records currently available, they have been requested. Patient has history of congenital cardiac malformation, aortic valve replacement at age 18 and on coumadin.   - Started on amiodarone drip prior to arrival.  -Patient had a coronary angiography done on 8/30/18, which revealed normal left main, left anterior descending, left circumflex, right coronary with moderate calibers and no CAD.  -Patient had a aortic valve fluoroscopy on 8/30/80 which revealed normal annular stability without rocking, and a normal leaflet motion.  -Appreciate cardiology recommendations  -Magnesium 1.6 phosphorus 1.6 potassium 3.6 on 8/31/18.  PLAN  - trop trended down.  - Has remained in NSR.   - echocardiogram shows reduced EF 35% may be secondary to cardiac event, but repeat cardiac MRI showed significant improvement in EF.   -Patient had AICD placed.  -Patient takes warfarin 7.5 mg on weekdays and 10 mg on Wednesdays and Fridays, and is trying to be scheduled by Betty in [place] on Tuesday 9/4/2018 both with the cardiologist and the Coumadin clinic.        Aortic stenosis- (present on admission)   Assessment & Plan    - S/p aortic valve replacement in 2003 for aortic stenosis, on coumadin  -Aortic valve fluoroscopy shows normal annular stability without rocking and a normal leaflet motion  -S/P SQ ICD and warfarin reinitiated.  INR at 2.73 at time of discharge.        VONDA (acute kidney injury) (HCC)- (present on admission)   Assessment & Plan    Mild VONDA on admission, baseline renal function not known.   -At admission elevated BUN/Cr 27/0.87 suggesting pre-renal   - possibly due to cardiac event vs dehydration from being out at burning [place]   -At time of discharge, BUN 10, creatinine 0.7.  -Resolved        Hyperglycemia- (present on admission)   Assessment & Plan    -Patient mildly hyperglycemic on admission, blood  glucose 119. Likely due to stress response   - will continue to monitor   -HbA1c at 5.3        Leukocytosis   Assessment & Plan    Wbc 14.9 on admission, no evidence of infection, afebrile. Likely secondary to stress from cardiac event   - resolved         Ventilator dependent (HCC)- (present on admission)   Assessment & Plan    - Intubated prior to arrival for concern of airway protection   - CXR shows pulmonary edema, likely due to arrhythmia and arrest, no evidence of consolidaton   -  echocardiogram showed dilated IVC and reduced EF 35%   - extubated 8/29, and weaned off oxygen.  -Satting at 95% on room air at time of discharge.        Elevated troponin- (present on admission)   Assessment & Plan    - Likely secondary to ventricular arrhythmia and CPR/Shock  - EKG without ischemic changes  -Trop came down, stopped trending

## 2018-09-02 NOTE — CARE PLAN
Problem: Safety  Goal: Will remain free from injury  Outcome: PROGRESSING AS EXPECTED  Bed locked and lowest position.Treaded socks on. Call light and belongings within reach. Pt educated to call for assistance. Pt verbalized understanding. Hourly rounding in place.     Problem: Knowledge Deficit  Goal: Knowledge of disease process/condition, treatment plan, diagnostic tests, and medications will improve  Outcome: PROGRESSING AS EXPECTED  Discussed POC and medications with pt, pt verbalized understanding.

## 2018-09-02 NOTE — PROGRESS NOTES
Pt is alert and oriented x4.  Seen by MD, orders written for discharge.  Removed IV & telemetry, no complications.  Gave ICD Pacemaker education and book.  Went over discharge instructions with pt, answered all questions.  Will follow up with [facility] [place] and Coumadin clinic once he is back in [place] Tuesday.  Pt ambulated with an escort for discharge, refused wheelchair, gait steady and balance intact.  Pt also has all belongings.

## 2018-09-02 NOTE — PROGRESS NOTES
2 RN Skin check completed with Radha TOMAS    Left Lateral Incision/ Pressure Dressing Clean/ Dry/ Intact/ No drainage noted    Pressure Points intact/ no redness

## 2018-09-02 NOTE — PROGRESS NOTES
Cardiology Progress Note               Author: Ann Pitts Date & Time created: 2018  7:52 AM       Consultation for out of hospital cardiac arrest    History of mechanical aortic valve prosthesis ([employer] aortic valve in [place] in ) on oral anticoagulation with warfarin, history of TIAs, nonischemic cardiomyopathy EF 35%        Coronary angiography 18 showed normal epicardial coronary arteries, normal aortic valve mechanical leaflet motion      Underwent successful placement of subcutaneous AICD on 18, [employer]      Underwent cardiac MR on 18, discussed with Dr. Toro,  showed unremarkable cardiac MR          Blood pressure 140/74  Heart rate 60's SR    Labs reviewed  BMP in good order        Review of Systems   Respiratory: Negative for cough and shortness of breath.    Cardiovascular: Negative for chest pain, palpitations, orthopnea, claudication, leg swelling and PND.       Physical Exam   Constitutional: He is oriented to person, place, and time. He appears well-developed.   Eyes: Conjunctivae are normal.   Neck: No JVD present. No thyromegaly present.   Cardiovascular: Regular rhythm.    Pulses:       Carotid pulses are 2+ on the right side, and 2+ on the left side.       Radial pulses are 2+ on the right side, and 2+ on the left side.        Posterior tibial pulses are 2+ on the right side, and 2+ on the left side.   Pulmonary/Chest: He has no wheezes.   Abdominal: Soft.   Musculoskeletal: He exhibits no edema.   Neurological: He is alert and oriented to person, place, and time.   Skin: Skin is warm and dry.   Pacemaker site uncomplicated  New dressing  applied        Hemodynamics:  Temp (24hrs), Av.1 °C (98.8 °F), Min:36.8 °C (98.2 °F), Max:37.6 °C (99.6 °F)  Temperature: 37.6 °C (99.6 °F)  Pulse  Av.3  Min: 58  Max: 96Heart Rate (Monitored): 76  Blood Pressure: 140/74, NIBP: 129/82     Respiratory:    Respiration: 18, Pulse Oximetry: 92 %     Work Of  Breathing / Effort: Mild  RUL Breath Sounds: Clear, RML Breath Sounds: Diminished, RLL Breath Sounds: Diminished, JERICA Breath Sounds: Clear, LLL Breath Sounds: Diminished  Fluids:  Date 09/01/18 0700 - 09/02/18 0659   Shift 5643-7748 1196-7207 6620-0659 24 Hour Total   I  N  T  A  K  E   I.V. 50   50    Shift Total 50   50   O  U  T  P  U  T   Shift Total       Weight (kg) 68.1 68.1 68.1 68.1       Weight: 67.9 kg (149 lb 11.1 oz)  GI/Nutrition:  Orders Placed This Encounter   Procedures   • Diet Order Regular     Standing Status:   Standing     Number of Occurrences:   1     Order Specific Question:   Diet:     Answer:   Regular [1]     Order Specific Question:   Miscellaneous modifications:     Answer:   Vegetarian [13]     Lab Results:  Recent Labs      08/31/18   0542  09/01/18 0628 09/02/18   0329   WBC  6.4  4.6*  3.9*   RBC  3.85*  4.30*  4.22*   HEMOGLOBIN  12.1*  13.5*  13.0*   HEMATOCRIT  35.4*  39.9*  38.4*   MCV  91.9  92.8  91.0   MCH  31.4  31.4  30.8   MCHC  34.2  33.8  33.9   RDW  43.3  44.0  41.1   PLATELETCT  155*  181  225   MPV  10.0  9.3  8.8*     Recent Labs      08/31/18   0542  09/01/18 0628  09/02/18   0329   SODIUM  140  137  138   POTASSIUM  3.6  3.9  3.8   CHLORIDE  110  108  106   CO2  25  21  25   GLUCOSE  95  90  102*   BUN  11  8  10   CREATININE  0.64  0.62  0.70   CALCIUM  7.9*  8.2*  8.6     Recent Labs      08/31/18   0038  08/31/18   0542  08/31/18   0730  09/01/18   0628 09/02/18   0329   APTT  80.5*   --   110.9*   --    --    INR   --   1.76*   --   2.42*  2.75*                     Medical Decision Making, by Problem:  Active Hospital Problems    Diagnosis   • *Cardiac arrest (HCC) [I46.9]   • Aortic stenosis [I35.0]   • Elevated troponin [R74.8]   • Ventilator dependent (HCC) [Z99.11]   • Leukocytosis [D72.829]   • Hyperglycemia [R73.9]   • VONDA (acute kidney injury) (HCC) [N17.9]       Plan:    Chest x-ray 9/1/18, unremarkable, no pneumothorax      Device was  interrogated , functioning well      Continue with Coumadin on DC, no need for ASA      Target INR 2.5- 3.5 (due to TIA)      INR this morning 2.75      EF on presentation 35%    - EF recovered nicely by Cardiac MR report   -No need to start GOMT  -Plan for DC today    He will schedule an appointment with his cardiologist in [place] in one week for device check          Quality-Core Measures   Reviewed items::  Medications reviewed and Labs reviewed

## 2018-09-02 NOTE — PROGRESS NOTES
Pt transferred from [facility] to [facility] [id number]

## 2018-09-02 NOTE — DISCHARGE INSTRUCTIONS
Discharge Instructions    Discharged to home by car with friend. Discharged via wheelchair, hospital escort: Yes.  Special equipment needed: Not Applicable    Be sure to schedule a follow-up appointment with your primary care doctor or any specialists as instructed.     Discharge Plan:   Diet Plan: Discussed  Activity Level: Discussed  Smoking Cessation Offered: Patient Refused  Confirmed Follow up Appointment: Patient to Call and Schedule Appointment  Confirmed Symptoms Management: Discussed  Medication Reconciliation Updated: Yes  Pneumococcal Vaccine Administered/Refused: Not given - Patient refused pneumococcal vaccine  Influenza Vaccine Indication: Patient Refuses    I understand that a diet low in cholesterol, fat, and sodium is recommended for good health. Unless I have been given specific instructions below for another diet, I accept this instruction as my diet prescription.   Other diet: Regular    Special Instructions: Diagnosis:  Acute Coronary Syndrome (ACS) is a diagnosis that encompasses cardiac-related chest pain and heart attack. ACS occurs when the blood flow to the heart muscle is severely reduced or cut off completely due to a slow process called atherosclerosis.  Atherosclerosis is a disease in which the coronary arteries become narrow from a buildup of fat, cholesterol, and other substances that combine to form plaque. If the plaque breaks, a blood clot will form and block the blood flow to the heart muscle. This lack of blood flow can cause damage or death to the heart muscle which is called a heart attack or Myocardial Infarction (MI). There are two different types of MIs:  ST Elevation Myocardial Infarction or STEMI (the most severe type of heart attack) and Non-ST Elevation Myocardial Infarction or NSTEMI.    Treatment Plan:  · Cardiac Diet  - Low fat, low salt, low cholesterol   · Cardiac Rehab  - Your doctor has ordered you a referral to [facility].  Call [phone] to schedule an  appointment.  · Attend my follow-up appointment with my Cardiologist.  · Take my medications as prescribed by my doctor  · Exercise daily  · Quit Smoking, Lower my bad cholesterol and raise my good cholesterol, lower my blood pressure, Reduce stress and Control my diabetes    Medications:  Certain medications are used to treat ACS.  Remember to always take medications as prescribed and never stop talking medications unless told by your doctor.    You have been prescribed the following medicatons:    None    · Is patient discharged on Warfarin / Coumadin?   Yes    You are receiving the drug warfarin. Please understand the importance of monitoring warfarin with scheduled PT/INR blood draws.  Follow-up with the [facility] in one week for INR lab..    IMPORTANT: HOW TO USE THIS INFORMATION:  This is a summary and does NOT have all possible information about this product. This information does not assure that this product is safe, effective, or appropriate for you. This information is not individual medical advice and does not substitute for the advice of your health care professional. Always ask your health care professional for complete information about this product and your specific health needs.      WARFARIN - ORAL (WARF-uh-rin)      COMMON BRAND NAME(S): Coumadin      WARNING:  Warfarin can cause very serious (possibly fatal) bleeding. This is more likely to occur when you first start taking this medication or if you take too much warfarin. To decrease your risk for bleeding, your doctor or other health care provider will monitor you closely and check your lab results (INR test) to make sure you are not taking too much warfarin. Keep all medical and laboratory appointments. Tell your doctor right away if you notice any signs of serious bleeding. See also Side Effects section.      USES:  This medication is used to treat blood clots (such as in deep vein thrombosis-DVT or pulmonary embolus-PE) and/or to  "prevent new clots from forming in your body. Preventing harmful blood clots helps to reduce the risk of a stroke or heart attack. Conditions that increase your risk of developing blood clots include a certain type of irregular heart rhythm (atrial fibrillation), heart valve replacement, recent heart attack, and certain surgeries (such as hip/knee replacement). Warfarin is commonly called a \"blood thinner,\" but the more correct term is \"anticoagulant.\" It helps to keep blood flowing smoothly in your body by decreasing the amount of certain substances (clotting proteins) in your blood.      HOW TO USE:  Read the Medication Guide provided by your pharmacist before you start taking warfarin and each time you get a refill. If you have any questions, ask your doctor or pharmacist. Take this medication by mouth with or without food as directed by your doctor or other health care professional, usually once a day. It is very important to take it exactly as directed. Do not increase the dose, take it more frequently, or stop using it unless directed by your doctor. Dosage is based on your medical condition, laboratory tests (such as INR), and response to treatment. Your doctor or other health care provider will monitor you closely while you are taking this medication to determine the right dose for you. Use this medication regularly to get the most benefit from it. To help you remember, take it at the same time each day. It is important to eat a balanced, consistent diet while taking warfarin. Some foods can affect how warfarin works in your body and may affect your treatment and dose. Avoid sudden large increases or decreases in your intake of foods high in vitamin K (such as broccoli, cauliflower, cabbage, brussels sprouts, kale, spinach, and other green leafy vegetables, liver, green tea, certain vitamin supplements). If you are trying to lose weight, check with your doctor before you try to go on a diet. Cranberry " products may also affect how your warfarin works. Limit the amount of cranberry juice (16 ounces/480 milliliters a day) or other cranberry products you may drink or eat.      SIDE EFFECTS:  Nausea, loss of appetite, or stomach/abdominal pain may occur. If any of these effects persist or worsen, tell your doctor or pharmacist promptly. Remember that your doctor has prescribed this medication because he or she has judged that the benefit to you is greater than the risk of side effects. Many people using this medication do not have serious side effects. This medication can cause serious bleeding if it affects your blood clotting proteins too much (shown by unusually high INR lab results). Even if your doctor stops your medication, this risk of bleeding can continue for up to a week. Tell your doctor right away if you have any signs of serious bleeding, including: unusual pain/swelling/discomfort, unusual/easy bruising, prolonged bleeding from cuts or gums, persistent/frequent nosebleeds, unusually heavy/prolonged menstrual flow, pink/dark urine, coughing up blood, vomit that is bloody or looks like coffee grounds, severe headache, dizziness/fainting, unusual or persistent tiredness/weakness, bloody/black/tarry stools, chest pain, shortness of breath, difficulty swallowing. Tell your doctor right away if any of these unlikely but serious side effects occur: persistent nausea/vomiting, severe stomach/abdominal pain, yellowing eyes/skin. This drug rarely has caused very serious (possibly fatal) problems if its effects lead to small blood clots (usually at the beginning of treatment). This can lead to severe skin/tissue damage that may require surgery or amputation if left untreated. Patients with certain blood conditions (protein C or S deficiency) may be at greater risk. Get medical help right away if any of these rare but serious side effects occur: painful/red/purplish patches on the skin (such as on the toe, breast,  abdomen), change in the amount of urine, vision changes, confusion, slurred speech, weakness on one side of the body. A very serious allergic reaction to this drug is rare. However, get medical help right away if you notice any symptoms of a serious allergic reaction, including: rash, itching/swelling (especially of the face/tongue/throat), severe dizziness, trouble breathing. This is not a complete list of possible side effects. If you notice other effects not listed above, contact your doctor or pharmacist. In the [place] - Call your doctor for medical advice about side effects. You may report side effects to FDA at 3-109-WQJ-4735. In [place] - Call your doctor for medical advice about side effects. You may report side effects to [facility] [place] at 1-946.349.6979.      PRECAUTIONS:  Before taking warfarin, tell your doctor or pharmacist if you are allergic to it; or if you have any other allergies. This product may contain inactive ingredients, which can cause allergic reactions or other problems. Talk to your pharmacist for more details. Before using this medication, tell your doctor or pharmacist your medical history, especially of: blood disorders (such as anemia, hemophilia), bleeding problems (such as bleeding of the stomach/intestines, bleeding in the brain), blood vessel disorders (such as aneurysms), recent major injury/surgery, liver disease, alcohol use, mental/mood disorders (including memory problems), frequent falls/injuries. It is important that all your doctors and dentists know that you take warfarin. Before having surgery or any medical/dental procedures, tell your doctor or dentist that you are taking this medication and about all the products you use (including prescription drugs, nonprescription drugs, and herbal products). Avoid getting injections into the muscles. If you must have an injection into a muscle (for example, a flu shot), it should be given in the arm. This way, it will be easier to check  for bleeding and/or apply pressure bandages. This medication may cause stomach bleeding. Daily use of alcohol while using this medicine will increase your risk for stomach bleeding and may also affect how this medication works. Limit or avoid alcoholic beverages. If you have not been eating well, if you have an illness or infection that causes fever, vomiting, or diarrhea for more than 2 days, or if you start using any antibiotic medications, contact your doctor or pharmacist immediately because these conditions can affect how warfarin works. This medication can cause heavy bleeding. To lower the chance of getting cut, bruised, or injured, use great caution with sharp objects like safety razors and nail cutters. Use an electric razor when shaving and a soft toothbrush when brushing your teeth. Avoid activities such as contact sports. If you fall or injure yourself, especially if you hit your head, call your doctor immediately. Your doctor may need to check you. The Food & Drug Administration has stated that generic warfarin products are interchangeable. However, consult your doctor or pharmacist before switching warfarin products. Be careful not to take more than one medication that contains warfarin unless specifically directed by the doctor or health care provider who is monitoring your warfarin treatment. Older adults may be at greater risk for bleeding while using this drug. This medication is not recommended for use during pregnancy because of serious (possibly fatal) harm to an unborn baby. Discuss the use of reliable forms of birth control with your doctor. If you become pregnant or think you may be pregnant, tell your doctor immediately. If you are planning pregnancy, discuss a plan for managing your condition with your doctor before you become pregnant. Your doctor may switch the type of medication you use during pregnancy. Very small amounts of this medication may pass into breast milk but is unlikely to  "harm a nursing infant. Consult your doctor before breast-feeding.      DRUG INTERACTIONS:  Drug interactions may change how your medications work or increase your risk for serious side effects. This document does not contain all possible drug interactions. Keep a list of all the products you use (including prescription/nonprescription drugs and herbal products) and share it with your doctor and pharmacist. Do not start, stop, or change the dosage of any medicines without your doctor's approval. Warfarin interacts with many prescription, nonprescription, vitamin, and herbal products. This includes medications that are applied to the skin or inside the vagina or rectum. The interactions with warfarin usually result in an increase or decrease in the \"blood-thinning\" (anticoagulant) effect. Your doctor or other health care professional should closely monitor you to prevent serious bleeding or clotting problems. While taking warfarin, it is very important to tell your doctor or pharmacist of any changes in medications, vitamins, or herbal products that you are taking. Some products that may interact with this drug include: capecitabine, imatinib, mifepristone. Aspirin, aspirin-like drugs (salicylates), and nonsteroidal anti-inflammatory drugs (NSAIDs such as ibuprofen, naproxen, celecoxib) may have effects similar to warfarin. These drugs may increase the risk of bleeding problems if taken during treatment with warfarin. Carefully check all prescription/nonprescription product labels (including drugs applied to the skin such as pain-relieving creams) since the products may contain NSAIDs or salicylates. Talk to your doctor about using a different medication (such as acetaminophen) to treat pain/fever. Low-dose aspirin and related drugs (such as clopidogrel, ticlopidine) should be continued if prescribed by your doctor for specific medical reasons such as heart attack or stroke prevention. Consult your doctor or " pharmacist for more details. Many herbal products interact with warfarin. Tell your doctor before taking any herbal products, especially bromelains, coenzyme Q10, cranberry, danshen, dong quai, fenugreek, garlic, ginkgo biloba, ginseng, and Dalzell's wort, among others. This medication may interfere with a certain laboratory test to measure theophylline levels, possibly causing false test results. Make sure laboratory personnel and all your doctors know you use this drug.      OVERDOSE:  If overdose is suspected, contact a poison control center or emergency room immediately. [place] residents can call the [place] National Poison Hotline at 1-930.631.3286. [place] residents can call a provincial poison control center. Symptoms of overdose may include: bloody/black/tarry stools, pink/dark urine, unusual/prolonged bleeding.      NOTES:  Do not share this medication with others. Laboratory and/or medical tests (such as INR, complete blood count) must be performed periodically to monitor your progress or check for side effects. Consult your doctor for more details.      MISSED DOSE:  For the best possible benefit, do not miss any doses. If you do miss a dose and remember on the same day, take it as soon as you remember. If you remember on the next day, skip the missed dose and resume your usual dosing schedule. Do not double the dose to catch up because this could increase your risk for bleeding. Keep a record of missed doses to give to your doctor or pharmacist. Contact your doctor or pharmacist if you miss 2 or more doses in a row.      STORAGE:  Store at room temperature away from light and moisture. Do not store in the bathroom. Keep all medications away from children and pets. Do not flush medications down the toilet or pour them into a drain unless instructed to do so. Properly discard this product when it is  or no longer needed. Consult your pharmacist or local waste disposal company for more details about how to  safely discard your product.      MEDICAL ALERT:  Your condition and medication can cause complications in a medical emergency. For information about enrolling in MedicAlert, call 1-174.337.6159 ([place]) or 1-320.595.4286 ([place]).      Information last revised October 2010 Copyright(c) 2010 [employer]             Depression / Suicide Risk    As [facility] are [facility], it is important to learn how to keep safe from harming yourself.    Recognize the warning signs:  · Abrupt changes in personality, positive or negative- including increase in energy   · Giving away possessions  · Change in eating patterns- significant weight changes-  positive or negative  · Change in sleeping patterns- unable to sleep or sleeping all the time   · Unwillingness or inability to communicate  · Depression  · Unusual sadness, discouragement and loneliness  · Talk of wanting to die  · Neglect of personal appearance   · Rebelliousness- reckless behavior  · Withdrawal from people/activities they love  · Confusion- inability to concentrate     If you or a loved one observes any of these behaviors or has concerns about self-harm, here's what you can do:  · Talk about it- your feelings and reasons for harming yourself  · Remove any means that you might use to hurt yourself (examples: pills, rope, extension cords, firearm)  · Get professional help from the community (Mental Health, Substance Abuse, psychological counseling)  · Do not be alone:Call your Safe Contact- someone whom you trust who will be there for you.  · Call your local CRISIS HOTLINE 391-1949 or 327-965-1245  · Call your local Children's Mobile Crisis Response Team [place] (125) 305-4763 or www.[employer]  · Call the toll free National Suicide Prevention Hotlines   · National Suicide Prevention Lifeline 281-028-ALZP (5539)  · National Hope Line Network 800-SUICIDE (653-5596)      Pacemaker Implantation, Adult  Pacemaker implantation  is a procedure to place a pacemaker inside your chest. A pacemaker is a small computer that sends electrical signals to the heart and helps your heart beat normally. A pacemaker also stores information about your heart rhythms. You may need pacemaker implantation if you:  · Have a slow heartbeat (bradycardia).  · Faint (syncope).  · Have shortness of breath (dyspnea) due to heart problems.  The pacemaker attaches to your heart through a wire, called a lead. Sometimes just one lead is needed. Other times, there will be two leads. There are two types of pacemakers:  · Transvenous pacemaker. This type is placed under the skin or muscle of your chest. The lead goes through a vein in the chest area to reach the inside of the heart.  · Epicardial pacemaker. This type is placed under the skin or muscle of your chest or belly. The lead goes through your chest to the outside of the heart.  Tell a health care provider about:  · Any allergies you have.  · All medicines you are taking, including vitamins, herbs, eye drops, creams, and over-the-counter medicines.  · Any problems you or family members have had with anesthetic medicines.  · Any blood or bone disorders you have.  · Any surgeries you have had.  · Any medical conditions you have.  · Whether you are pregnant or may be pregnant.  What are the risks?  Generally, this is a safe procedure. However, problems may occur, including:  · Infection.  · Bleeding.  · Failure of the pacemaker or the lead.  · Collapse of a lung or bleeding into a lung.  · Blood clot inside a blood vessel with a lead.  · Damage to the heart.  · Infection inside the heart (endocarditis).  · Allergic reactions to medicines.  What happens before the procedure?  Staying hydrated   Follow instructions from your health care provider about hydration, which may include:  · Up to 2 hours before the procedure - you may continue to drink clear liquids, such as water, clear fruit juice, black coffee, and plain  tea.  Eating and drinking restrictions   Follow instructions from your health care provider about eating and drinking, which may include:  · 8 hours before the procedure - stop eating heavy meals or foods such as meat, fried foods, or fatty foods.  · 6 hours before the procedure - stop eating light meals or foods, such as toast or cereal.  · 6 hours before the procedure - stop drinking milk or drinks that contain milk.  · 2 hours before the procedure - stop drinking clear liquids.  Medicines  · Ask your health care provider about:  ¨ Changing or stopping your regular medicines. This is especially important if you are taking diabetes medicines or blood thinners.  ¨ Taking medicines such as aspirin and ibuprofen. These medicines can thin your blood. Do not take these medicines before your procedure if your health care provider instructs you not to.  · You may be given antibiotic medicine to help prevent infection.  General instructions  · You will have a heart evaluation. This may include an electrocardiogram (ECG), chest X-ray, and heart imaging (echocardiogram,  or echo) tests.  · You will have blood tests.  · Do not use any products that contain nicotine or tobacco, such as cigarettes and e-cigarettes. If you need help quitting, ask your health care provider.  · Plan to have someone take you home from the hospital or clinic.  · If you will be going home right after the procedure, plan to have someone with you for 24 hours.  · Ask your health care provider how your surgical site will be marked or identified.  What happens during the procedure?  · To reduce your risk of infection:  ¨ Your health care team will wash or sanitize their hands.  ¨ Your skin will be washed with soap.  ¨ Hair may be removed from the surgical area.  · An IV tube will be inserted into one of your veins.  · You will be given one or more of the following:  ¨ A medicine to help you relax (sedative).  ¨ A medicine to numb the area (local  anesthetic).  ¨ A medicine to make you fall asleep (general anesthetic).  · If you are getting a transvenous pacemaker:  ¨ An incision will be made in your upper chest.  ¨ A pocket will be made for the pacemaker. It may be placed under the skin or between layers of muscle.  ¨ The lead will be inserted into a blood vessel that returns to the heart.  ¨ While X-rays are taken by an imaging machine (fluoroscopy), the lead will be advanced through the vein to the inside of your heart.  ¨ The other end of the lead will be tunneled under the skin and attached to the pacemaker.  · If you are getting an epicardial pacemaker:  ¨ An incision will be made near your ribs or breastbone (sternum) for the lead.  ¨ The lead will be attached to the outside of your heart.  ¨ Another incision will be made in your chest or upper belly to create a pocket for the pacemaker.  ¨ The free end of the lead will be tunneled under the skin and attached to the pacemaker.  · The transvenous or epicardial pacemaker will be tested. Imaging studies may be done to check the lead position.  · The incisions will be closed with stitches (sutures), adhesive strips, or skin glue.  · Bandages (dressing) will be placed over the incisions.  The procedure may vary among health care providers and hospitals.  What happens after the procedure?  · Your blood pressure, heart rate, breathing rate, and blood oxygen level will be monitored until the medicines you were given have worn off.  · You will be given antibiotics and pain medicine.  · ECG and chest x-rays will be done.  · You will wear a continuous type of ECG (Holter monitor) to check your heart rhythm.  · Your health care provider will program the pacemaker.  · Do not drive for 24 hours if you received a sedative.  This information is not intended to replace advice given to you by your health care provider. Make sure you discuss any questions you have with your health care provider.  Document Released:  12/08/2003 Document Revised: 07/07/2017 Document Reviewed: 05/31/2017  [employer] Interactive Patient Education © 2017 [employer]      Implantable Cardioverter Defibrillator  Any irregularity in your heart's natural rhythm is called an arrhythmia. Almost everyone's heart skips beats. These mild palpitations are usually harmless. Electrical impulses from the heart muscle cause your heart to beat (contract). This electrical signal begins in the sinoatrial (SA) node, located at the top of the heart's upper-right chamber (the right atrium). The SA node sets the pace for how fast the heart will beat.   When an electrical impulse is released from the SA node, it causes the upper chambers of the heart (the atria) to contract. The signal then passes through the atrioventricular (AV) node. The AV node checks the signal and sends it through the muscle fibers of the lower chambers (the ventricles), causing them to contract. The SA node sends electrical impulses at a certain rate, but your heart rate may be changed by physical demands, stress, or other factors.  Implantable cardioverter defibrillators (ICDs) are for people who have had an abnormal, fast heart rate that caused them to faint or caused their heart to stop pumping properly. Sometimes medicines can be used to control these fast heart rates. When medicines do not work, doctors can implant an ICD.  An ICD is used in patients at risk for:  · Ventricular tachycardia, when the lower chambers of the heart independently beat faster than 100 beats per minute.  · Ventricular fibrillation, when the muscle fibers of the lower chambers of the heart contract in a fast, uncoordinated manner.  · Sudden cardiac death likely to be caused by an arrhythmia.  CAUSES   Sometimes the SA node does not work properly. This can cause the heart to beat too fast, too slow, or irregularly. In other cases, the heart's electrical pathways may be blocked. This can also cause an irregular heart  "rhythm. Arrhythmias can be divided into two categories: ventricular and supraventricular. Ventricular arrhythmias happen in the heart's two lower chambers, called the ventricles. Supraventricular arrhythmias happen in the structures above the ventricles, mainly in the atria. Atria are the heart's two upper chambers.  SYMPTOMS   A very slow heart rate, called bradycardia, means the heart rate is less than 60 beats per minute. Tachycardia is a very fast heart rate. This means the heart beats faster than 100 beats per minute. Fibrillation, the most serious form of arrhythmia, results in uncoordinated contractions of individual heart-muscle fibers.  TREATMENT   · An ICD is usually about the size of a pager. It is made up of two parts:  · A pulse generator that includes the battery and several electronic circuits.  · Wires, also called leads. Depending on the type of ICD, you may have one or two leads.  · The ICD is implanted beneath the skin near the collarbone or somewhere at or above the waistline. The leads are placed inside the heart or on its surface and are attached to the ICD.  · ICD implant surgery has become a very common procedure. It is done while you are asleep. It is not open heart surgery. Some of the newer, smaller devices have simpler lead systems that can be inserted with a catheterization procedure.  After the device is implanted, caregivers will perform electrophysiology studies (EPS) to make sure the device is working properly. The procedure usually takes about two hours. You will need to stay in the hospital for at least one night after the device is implanted. Antiarrhythmic medicines may also be prescribed.  Once the ICD is implanted, the leads monitor your heart rate. If the ICD detects ventricular tachycardia or fibrillation, it sends out a controlled burst of impulses (called \"overdrive\" pacing). If that does not work, the ICD \"shocks\" the heart to restore a normal rhythm. Newer ICD devices can " "also work like a pacemaker if a slow heart rate (bradycardia) occurs. When ventricular tachycardia or fibrillation occurs, the ICD records your heart rate, as well as the date and time of the event. It can also record what electrical therapy was needed to restore a normal heart rhythm.  HOME CARE INSTRUCTIONS   · If you have an ICD, you should always be aware of your surroundings. It is safe for you to be around wood working tools and normal home appliances, including microwave ovens. Stay away from heavy equipment that has very strong magnetic fields or strong electric fields (antennas, arc welders, and industrial equipment). Your caregiver will help you understand what to avoid when you have an ICD.  · You should keep your cell phone at least 6 inches away from your ICD. When you are talking on your cell phone, hold it on the opposite side of the body from your ICD. Do not carry your cell phone in your breast pocket or on your belt if is within 6 inches of your ICD.  · Many caregivers will tell you not to drive for 6 months after your implantation surgery or after a shock. Talk to your doctor if you are not sure if it is safe for you to drive.  · Tachycardia is usually corrected with very small electrical impulses. You may feel a flutter, palpitations (like your heart is skipping a beat), or nothing at all. Fibrillation may require you receive a \"shock.\" Most patients say the shock feels like a sudden jolt or thump to the chest. Some people black out during fibrillation, so they may not feel anything when the shock is given. If someone is touching you while you are receiving a shock, they may feel a small muscle jerk, but it will not hurt them.  · Your caregiver will tell you what to do after you have received a shock. Some patients' caregivers will have them call their office after a shock has been given. Most caregivers will want to know if you have received two or more shocks within 24 hours.  · After you get " your ICD, you will have to see your caregiver for regular check-ups. ICDs can also be checked over the telephone. This is called transtelephonic monitoring. Even with telephone monitoring, you will still need to go to your caregiver's office for regular check-ups. The ICD is checked with a device called a . When the  is held over the ICD, your caregiver is able to tell if the ICD is working properly, how much power is left in the battery, and if the device has delivered therapy. The  can also be used to change the settings of the ICD.  · An ICD battery usually lasts anywhere between 4 and 8 years, depending on how many shocks it sends. When the battery runs down, a new ICD will be implanted. In most cases, your original ICD leads will not need to be replaced.  SEEK IMMEDIATE MEDICAL CARE IF:   · You have a frequent or persistent triggering of your defibrillator.  · You have any sign of inflammation, heat, tenderness, or swelling around the pacemaker.  · You experience unexplained fatigue, dizziness, or fainting or if there is a sensation of pulsation in your neck or chest.  MAKE SURE YOU:   · Understand these instructions.  · Will watch your condition.  · Will get help right away if you are not doing well or get worse.  Document Released: 09/14/2005 Document Revised: 03/11/2013 Document Reviewed: 04/03/2008  [employer]® Patient Information ©2014 [employer].

## 2018-09-02 NOTE — PROGRESS NOTES
Received report from GENOVEVA Garcia.  Assumed care of pt.  Completed assessment.  Pt is alert and oriented x4.  VSS.  No signs of distress. Cardiology in to see pt, cleared for discharge today.  Reviewed plan of care with pt.  Call light within reach, bed in low position, will continue to monitor.

## 2018-09-02 NOTE — PROGRESS NOTES
[facility] ICU Transfer Note                                                                             Attending: Dr Landon Irving  Resident: Dr. Katelynn Che  Date of admission: 8/29/2018  Date of transferring out from ICU:  09/02/18    Primary Diagnosis:   Cardiac arrest secondary to possible nonischemic cardiomyopathy requiring CPR and defibrillation.     Secondary Diagnoses:                Principal Problem:    Cardiac arrest (HCC) POA: Yes  Active Problems:    Aortic stenosis POA: Yes    Elevated troponin POA: Yes    Ventilator dependent (HCC) POA: Yes    Leukocytosis POA: Unknown    Hyperglycemia POA: Yes    VONDA (acute kidney injury) (HCC) POA: Yes  Resolved Problems:    * No resolved hospital problems.       HPI and ICU Course Summary (Brief Narrative):  32 yo male with PMHx of aortic stenosis s/p mechanical aortic valve replacement in 2003 on coumadin and 2 previous TIA events. Patient was attending [employer] and performing when he  Suffered cardiac arrest requiring CPR and 2 AED shocks, time frame of event unclear. He was given loading dose of amiodarone and was intubated outside of hospital. Per chart review and  of the patient who is present prior to intubation patient was alert and oriented but was experiencing significant panic and was intubated due to concern for protection of airway. On admission, patient had elevated troponin of 2. He has been maintained in sinus rhythm on amiodarone drip and was started on Heparin drip for anticoagulation, Cardiology is following. Chest xray showed pulmonary edema. CT head negative of infarction, hemorrhage or mass.   Per , had congenital heart defect with multiple surgeries in the past. 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  He denies any drug use prior to event. Denies any known history of sudden cardiac death in family.   Patient was admitted into the ICU, for monitoring, and had echocardiogram done which shows reduced ejection fraction of 35% likely secondary to cardiac event and  a coronary angiography done on 8/30/18  revealed normal left main, left anterior descending, left circumflex, right coronary with moderate calibers and no CAD.  Patient had a aortic valve fluoroscopy on 8/30/18, which revealed normal annular stability without rocking and a normal leaflet motion.   Patient was admitted in the ICU and was monitored for arrhythmias, electrolyte repletion, continued on that it heparin as patient has a history of a mechanical aortic valve, and was scheduled by cardiology for implantation of a SQ ICD, [place] model A219, serial #[id number]  was placed on 8/31/2018, with a DFT<65 J, with a follow-up in device clinic for wound check and device interrogation.  She will be discharged on his home warfarin dosage of , 7.5 mg once daily on weekdays, and 10 mg on Wednesdays and Fridays, until he is followed up at the Coumadin clinic, and by his cardiologist, and PCP, in [facility] in [place], where he gets his follow-ups done, on 9/4/18.          Things to follow:   Follow up on coumadin clinic appointment at [facility],appointment for his Anticoagulation needs,and appointments with cardiology and his PCP.  Follow up with cardiology for any recommendations and the Cardiac MRI report.

## 2018-09-03 LAB
BACTERIA BLD CULT: NORMAL
BACTERIA BLD CULT: NORMAL
SIGNIFICANT IND 70042: NORMAL
SIGNIFICANT IND 70042: NORMAL
SITE SITE: NORMAL
SITE SITE: NORMAL
SOURCE SOURCE: NORMAL
SOURCE SOURCE: NORMAL